# Patient Record
Sex: MALE | Race: WHITE | Employment: OTHER | URBAN - METROPOLITAN AREA
[De-identification: names, ages, dates, MRNs, and addresses within clinical notes are randomized per-mention and may not be internally consistent; named-entity substitution may affect disease eponyms.]

---

## 2017-10-06 ENCOUNTER — HOSPITAL ENCOUNTER (OUTPATIENT)
Dept: PET IMAGING | Age: 62
Discharge: HOME | End: 2017-10-06
Attending: INTERNAL MEDICINE
Payer: COMMERCIAL

## 2017-10-06 DIAGNOSIS — C43.9 MELANOMA (HCC): ICD-10-CM

## 2017-10-06 PROCEDURE — 78815 PET IMAGE W/CT SKULL-THIGH: CPT

## 2017-10-26 ENCOUNTER — HOSPITAL ENCOUNTER (OUTPATIENT)
Dept: ULTRASOUND IMAGING | Age: 62
Discharge: HOME | End: 2017-10-26
Attending: PHYSICIAN ASSISTANT
Payer: COMMERCIAL

## 2017-10-26 DIAGNOSIS — C43.4 MALIGNANT MELANOMA OF SKIN OF SCALP AND NECK (HCC): ICD-10-CM

## 2017-10-26 PROCEDURE — 76770 US EXAM ABDO BACK WALL COMP: CPT

## 2018-12-13 ENCOUNTER — HOSPITAL ENCOUNTER (OUTPATIENT)
Dept: ULTRASOUND IMAGING | Age: 63
Discharge: HOME | End: 2018-12-13
Attending: FAMILY MEDICINE
Payer: COMMERCIAL

## 2018-12-13 DIAGNOSIS — N28.1 RENAL CYST: ICD-10-CM

## 2018-12-13 PROCEDURE — 76770 US EXAM ABDO BACK WALL COMP: CPT

## 2020-08-12 ENCOUNTER — OFFICE VISIT (OUTPATIENT)
Dept: ORTHOPEDIC SURGERY | Age: 65
End: 2020-08-12

## 2020-08-12 VITALS
OXYGEN SATURATION: 99 % | BODY MASS INDEX: 26.21 KG/M2 | RESPIRATION RATE: 18 BRPM | TEMPERATURE: 97.5 F | HEART RATE: 87 BPM | WEIGHT: 167 LBS | SYSTOLIC BLOOD PRESSURE: 128 MMHG | HEIGHT: 67 IN | DIASTOLIC BLOOD PRESSURE: 88 MMHG

## 2020-08-12 DIAGNOSIS — M75.01 ADHESIVE CAPSULITIS OF RIGHT SHOULDER: Primary | ICD-10-CM

## 2020-08-12 DIAGNOSIS — M25.511 RIGHT SHOULDER PAIN, UNSPECIFIED CHRONICITY: ICD-10-CM

## 2020-08-12 RX ORDER — MELOXICAM 15 MG/1
15 TABLET ORAL
Qty: 30 TAB | Refills: 1 | Status: SHIPPED | OUTPATIENT
Start: 2020-08-12

## 2020-08-12 NOTE — PROGRESS NOTES
Ida Merlos  1955   Chief Complaint   Patient presents with    Shoulder Pain     Right shoulder pain        HISTORY OF PRESENT ILLNESS  Ida Merlos is a 72 y.o. male who presents today for evaluation of right shoulder. Pt referred by Dr. Marcy Yee. He rates his pain 0/10 today. Pain has been present since the end of April after throwing a baseball to his dog. Has tried taking an NSAID. Pain with certain activities, such was with swimming and playing tennis. No night pain. He does note some improvement since the initial onset of pain. Pain with certain movements, such as reaching behind. Has hx of a collarbone fracture, did not have surgery for this. He tried PT for the collarbone fracture. Patient describes the pain as sharp that is Intermittent in nature. Symptoms are worse with certain movements of the arm, Activity and is better with  Rest. Associated symptoms include nothing. Since problem started, it: has slightly improved. Pain does not wake patient up at night. Has taken NSAID for the problem. Has tried following treatments: Injections:NO; Brace:NO;  Therapy:NO; Cane/Crutch:NO       No Known Allergies     Past Medical History:   Diagnosis Date    Collar bone fracture     Right foot pain     Lateral plantar foot pain, possible compression neuropathy    Wears glasses       Social History     Socioeconomic History    Marital status:      Spouse name: Not on file    Number of children: Not on file    Years of education: Not on file    Highest education level: Not on file   Occupational History    Not on file   Social Needs    Financial resource strain: Not on file    Food insecurity     Worry: Not on file     Inability: Not on file   [employer] needs     Medical: Not on file     Non-medical: Not on file   Tobacco Use    Smoking status: Never Smoker    Smokeless tobacco: Never Used   Substance and Sexual Activity    Alcohol use: Yes     Comment: weekly  Drug use: No    Sexual activity: Not on file   Lifestyle    Physical activity     Days per week: Not on file     Minutes per session: Not on file    Stress: Not on file   Relationships    Social connections     Talks on phone: Not on file     Gets together: Not on file     Attends Voodoo service: Not on file     Active member of club or organization: Not on file     Attends meetings of clubs or organizations: Not on file     Relationship status: Not on file    Intimate partner violence     Fear of current or ex partner: Not on file     Emotionally abused: Not on file     Physically abused: Not on file     Forced sexual activity: Not on file   Other Topics Concern    Not on file   Social History Narrative    Not on file      Past Surgical History:   Procedure Laterality Date    HX APPENDECTOMY  1967    [address] Right     umbilical and groin    HX TONSILLECTOMY Bilateral 1974      Family History   Problem Relation Age of Onset    Arthritis-osteo Other     Hypertension Other       Current Outpatient Medications   Medication Sig    ergocalciferol (VITAMIN D2) 50,000 unit capsule Take 50,000 Units by mouth.  omega-3 fatty acids (FISH OIL) Cap Take  by mouth. No current facility-administered medications for this visit. REVIEW OF SYSTEM   Patient denies: Weight loss, Fever/Chills, HA, Visual changes, Fatigue, Chest pain, SOB, Abdominal pain, N/V/D/C, Blood in stool or urine, Edema. Pertinent positive as above in HPI. All others were negative    PHYSICAL EXAM:   Visit Vitals  /88 (BP 1 Location: Right arm, BP Patient Position: Sitting)   Pulse 87   Temp 97.5 °F (36.4 °C)   Resp 18   Ht 5' 7\" (1.702 m)   Wt 167 lb (75.8 kg)   SpO2 99%   BMI 26.16 kg/m²     The patient is a well-developed, well-nourished male   in no acute distress. The patient is alert and oriented times three. The patient is alert and oriented times three.  Mood and affect are normal.  LYMPHATIC: lymph nodes are not enlarged and are within normal limits  SKIN: normal in color and non tender to palpation. There are no bruises or abrasions noted. NEUROLOGICAL: Motor sensory exam is within normal limits. Reflexes are equal bilaterally. There is normal sensation to pinprick and light touch  MUSCULOSKELETAL:  Examination Right shoulder   Skin Intact   AC joint tenderness -   Biceps tenderness -   Forward flexion/Elevation    Active abduction    Glenohumeral abduction 80   External rotation ROM 45   Internal rotation ROM 30   Apprehension -   Yvettes Relocation -   Jerk -   Load and Shift -   Obriens -   Speeds -   Impingement sign +   Supraspinatus/Empty Can -, 5/5   External Rotation Strength -, 5/5   Lift Off/Belly Press -, 5/5   Neurovascular Intact       IMAGING: XR of right shoulder obtained in the office dated 8/12/2020 was reviewed and read by Dr. Good Hicks: Sclerotic changes in the greater tuberosity. IMPRESSION:      ICD-10-CM ICD-9-CM    1. Adhesive capsulitis of right shoulder  M75.01 726.0 REFERRAL TO PHYSICAL THERAPY      meloxicam (Mobic) 15 mg tablet   2. Right shoulder pain, unspecified chronicity  M25.511 719.41 AMB POC XRAY, SHOULDER; COMPLETE, 2+        PLAN:  1. Pt presents today with right shoulder pain due to adhesive capsulitis and I would like for him to begin PT. Was also given a prescription for Mobic today. Risk factors include: n/a   2. No ultrasound exam indicated today  3. No cortisone injection indicated today   4. Yes Physical/Occupational Therapy indicated today  5. No diagnostic test indicated today:   6. No durable medical equipment indicated today  7. No referral indicated today   8. Yes medications indicated today: MOBIC  9. No Narcotic indicated today       RTC 4 weeks    Office note will be sent to referring provider.     Scribed by Thad Peterson) as dictated by Luciano Stewart MD    I, Dr. Luciano Stewart, confirm that all documentation is accurate.     Scott Hernandez M.D.   [facility]

## 2020-08-19 ENCOUNTER — HOSPITAL ENCOUNTER (OUTPATIENT)
Dept: PHYSICAL THERAPY | Age: 65
Discharge: HOME | End: 2020-08-19
Payer: MEDICARE

## 2020-08-19 PROCEDURE — 97110 THERAPEUTIC EXERCISES: CPT

## 2020-08-19 PROCEDURE — 97112 NEUROMUSCULAR REEDUCATION: CPT

## 2020-08-19 PROCEDURE — 97161 PT EVAL LOW COMPLEX 20 MIN: CPT

## 2020-08-19 NOTE — PROGRESS NOTES
[facility]  [address]  [place], [address]  [phone] fax    Plan of Care/ Statement of Necessity for Physical Therapy Services    Patient name: Shelley Reid Start of Care: 2020   Referral source: Rajendra Lundy : 1955    Medical Diagnosis: Right shoulder pain [M25.511]  Payor: [place] MEDICARE / Plan: [place] MEDICARE PART A & B / Product Type: Medicare /  Onset VKTJ:3-    Treatment Diagnosis: Right Shoulder Pain   Prior Hospitalization: see medical history Provider#: [id number]   Medications: Verified on Patient summary List    Comorbidities: Skin Cancer    Prior Level of Function: Independent      The Plan of Care and following information is based on the information from the initial evaluation. Assessment/ key information:   Patient is a 73 yo male with 4month history of Right shoulder pain/dysfunction. His orginial injury occurred when he threw a ball for the dog in an underhand motion. Currently he reports difficulty reaching behind back, quick motions, motions that involve reaching across body and weighted motions overhead. He also reports he is unable to use arm to push up from floor. Grossly his strength in his Right UE is functional grossly 4-5/5. However he has poor scap/humeral rhythm especially in ABD/Scap and greater pain with eccentric motion that is elivated with support. He demonstrates some shoulder instability with positive lift off, posterior impingement, apprehension/relocation, cross arm AC joint testing. Evaluation Complexity History MEDIUM  Complexity : 1-2 comorbidities / personal factors will impact the outcome/ POC ; Examination MEDIUM Complexity : 3 Standardized tests and measures addressing body structure, function, activity limitation and / or participation in recreation  ;Presentation MEDIUM Complexity : Evolving with changing characteristics  ; Clinical Decision Making MEDIUM Complexity : FOTO score of 26-74  Overall Complexity Rating: MEDIUM  Problem List: pain affecting function, decrease ROM, decrease strength, decrease ADL/ functional abilitiies and decrease flexibility/ joint mobility   Treatment Plan may include any combination of the following: Therapeutic exercise, Therapeutic activities, Neuromuscular re-education, Manual therapy and Patient education  Patient / Family readiness to learn indicated by: asking questions, trying to perform skills and interest  Persons(s) to be included in education: patient (P)  Barriers to Learning/Limitations: None  Patient Goal (s): Regain ROM and strength in Right shoulder  Patient Self Reported Health Status: good  Rehabilitation Potential: good    Short Term Goals: To be accomplished in 2 weeks:   1. Patient will be educated and independent with HEP   2. Patient to be educated and independent in shoulder setting with ROM and strength exercise. Long Term Goals: To be accomplished in 4 weeks:   1. Patient to report ability to reach across body when showering for ADL's   2. Patient to report ability to reach back while driving with minimal pain. 3.  Patient to report ability to overhead reach without eccentric pain. 4. Patient to report increased FOTO score to 74 or greater for return to function. Frequency / Duration: Patient to be seen 2-3  times per week for 4 weeks. Patient/ Caregiver education and instruction: Diagnosis, prognosis, self care, activity modification and exercises   [x]  Plan of care has been reviewed with PTA    Certification Period: 08/19/2020 -09/17/2020  Key Rodriguez, PT 8/19/2020 3:59 PM    ________________________________________________________________________    I certify that the above Therapy Services are being furnished while the patient is under my care. I agree with the treatment plan and certify that this therapy is necessary.     Physician's Signature:____________________ Date:____________Time: _________    Please sign and return to [facility] [address] [address] [address]  [place], [facility] [address]  [phone] fax

## 2020-08-19 NOTE — PROGRESS NOTES
PT DAILY TREATMENT NOTE/SHOULDER EVAL 10-18    Patient Name: Joseph Montgomery  Date:2020  : 1955  [x]  Patient  Verified  Payor: VA MEDICARE / Plan: VA MEDICARE PART A & B / Product Type: Medicare /    In time:1030  Out time:1115  Total Treatment Time (min): 45  Visit #: 1  of     Medicare/BCBS Only   Total Timed Codes (min):  25 1:1 Treatment Time:  45       Treatment Area: Right shoulder pain [M25.511]    SUBJECTIVE  Pain Level (0-10 scale): 0/10 at rest, 5-1/ with certain activities. []constant [x]intermittent [x]improving []worsening []no change since onset  Improved pain at rest, but avoiding most activities that cause pain. Any medication changes, allergies to medications, adverse drug reactions, diagnosis change, or new procedure performed?: [x] No    [] Yes (see summary sheet for update)  Subjective functional status/changes:     PLOF: Independent pain free shoulder use/motion  Limitations to PLOF: Pain  Mechanism of Injury: Throwing ball  Current symptoms/Complaints: Most pain in posterior lateral shoulder throbbing pain, and can be sharp at times. Pain now also in anterior shoulder. Previous Treatment/Compliance: None for this. PMHx/Surgical Hx: No recent  Work Hx: Retired  Living Situation: Wife and two dogs. Pt Goals: \" Regain ROM and strength in Right shoulder\"  Barriers: []pain []financial []time []transportation []other None  Motivation: Good      OBJECTIVE/EXAMINATION  Activity/Recreational Limitations: Unable to throw ball, Unable to swim front crawl stroke  Mobility: Can't reach into back seat while driving with right arm, Can't push up from floor with right arm, can't perform quick motions, unable to lift weight above head. Self Care: Can't reach across body to wash under Left armpit with right hand.       20 min [x]Eval                  []Re-Eval       15 min Therapeutic Exercise:  [] See flow sheet :   Rationale: increase ROM, increase strength and increase proprioception to improve the patients ability to perform ADL's with ease. 10 min Neuromuscular Re-education:  [x]  See flow sheet : Shoulder positioning and low trap setting with rest and movement. Postural education to decrease impingement. Rationale: increase ROM, increase strength and increase proprioception  to improve the patients ability to perform ADL's with ease. With   [] TE   [] TA   [] neuro   [] other: Patient Education: [x] Review HEP    [] Progressed/Changed HEP based on:   [] positioning   [] body mechanics   [] transfers   [] heat/ice application    [] other:        Physical Therapy Evaluation - Shoulder    Posture: [] Poor    [x] Fair    [] Good    Describe: Anterior Head positioning, inferior Right Shoulder depressed scapula, sway back. ROM:  [] Unable to assess at this time                                           AROM  Standing                         PROM: Supine   Left Right  Left Right   Flexion  120 Flexion  115   Extension  NT Extension  NT   Scaption/ABD  120 Scaptin/ABD  90   ER @ 0 Degrees  40 ER @ 0 Degrees  20   ER @ 90 Degrees  NT ER @ 90 Degrees  35   IR  sacrum IR @ 90 Degrees  20     End Feel / Painful Arc:  AROM includes upper trap and scapula elevation. Increased pain with eccentric ROM. PROM is limited at empty pain limited endfeel     Strength:   [] Unable to assess at this time                                                                            L (1-5) R (1-5) Pain   Flexors  4+ [] Yes   [] No   Abductors  4 [] Yes   [] No   External Rotators  4 [] Yes   [] No   Internal Rotators  5 [] Yes   [] No   Supraspinatus  4 [] Yes   [] No   Serratus Anterior  4 [] Yes   [] No   Lower Trapezius  PAIN [] Yes   [] No   Elbow Flexion  5 [] Yes   [] No   Elbow Extension  5 [] Yes   [] No       Scapulohumoral Control / Rhythm:  Able to eccentrically lower with good control?  Left: [] Yes   [] No     Right: [] Yes   [x] No    Accessory Motions:Upper Trap recruitment, scapula ABD and elevation  Palpation  [] Min  [x] Mod  [] Severe    Location: AC Joint, Supra, posterior capsule       Optional Tests:    Adson's Test  [] Pos   [] Neg Yergason's Test [] Pos   [] Neg  Sandra's Test  [] Pos   [] Neg Amish's Sign [] Pos   [] Neg  Neer's Test  [] Pos   [x] Neg Clunk Test  [] Pos   [] Neg  Hawkin's Test  [x] Pos   [] Neg AC Joint  [x] Pos   [] Neg  Speed's Test  [] Pos   [x] Neg SC Joint  [] Pos   [] Neg  Empty Can  [] Pos   [x] Neg Pectoral Tightness [] Pos   [] Neg  Anterior Apprehension [x] Pos   [] Neg   Posterior Apprehension [] Pos   [] Neg      Other Tests / Comments: Drop Arm, Positive, Lift off Positive, Cross Arm Positive     Pain Level (0-10 scale) post treatment: 1/10 at rest    ASSESSMENT/Changes in Function: Patient is a 73 yo male with 4month history of Right shoulder pain/dysfunction. His orginial injury occurred when he threw a ball for the dog in an underhand motion. Currently he reports difficulty reaching behind back, quick motions, motions that involve reaching across body and weighted motions overhead. He also reports he is unable to use arm to push up from floor. Grossly his strength in his Right UE is functional grossly 4-5/5. However he has poor scap/humeral rhythm especially in ABD/Scap and greater pain with eccentric motion that is elivated with support. He demonstrates some shoulder instability with positive lift off, posterior impingement, apprehension/relocation, cross arm AC joint testing. Patient will continue to benefit from skilled PT services to modify and progress therapeutic interventions, address functional mobility deficits, address ROM deficits, address strength deficits, analyze and address soft tissue restrictions, analyze and cue movement patterns and assess and modify postural abnormalities to attain remaining goals.      [x]  See Plan of Care  []  See progress note/recertification  []  See Discharge Summary Progress towards goals / Updated goals:  Short Term Goals: To be accomplished in 2 weeks:   1. Patient will be educated and independent with HEP   EVAL: Issued Initial Exercise    2. Patient to be educated and independent in shoulder setting with ROM and strength exercise. EVAL:  Educated pt in shoulder setting. Long Term Goals: To be accomplished in 4 weeks:   1. Patient to report ability to reach across body when showering for ADL's   EVAL:  Patient unable to reach across body to opposite axilla. 2.  Patient to report ability to reach back while driving with minimal pain. EVAL:  Patient unable to reach behind without severe pain. 3.  Patient to report ability to overhead reach without eccentric pain. EVAL:  Patient needing upper arm support with eccentric motion. 4. Patient to report increased FOTO score to 74 or greater for return to function.      EVAL:  FOTO 61    PLAN  []  Upgrade activities as tolerated     [x]  Continue plan of care  []  Update interventions per flow sheet       []  Discharge due to:_  []  Other:_      Urban Salinas, PT 8/19/2020  10:38 AM

## 2020-08-24 ENCOUNTER — HOSPITAL ENCOUNTER (OUTPATIENT)
Dept: PHYSICAL THERAPY | Age: 65
Discharge: HOME | End: 2020-08-24
Payer: MEDICARE

## 2020-08-24 PROCEDURE — 97140 MANUAL THERAPY 1/> REGIONS: CPT

## 2020-08-24 PROCEDURE — 97110 THERAPEUTIC EXERCISES: CPT

## 2020-08-24 NOTE — PROGRESS NOTES
PT DAILY TREATMENT NOTE 10-18    Patient Name: Maci Search  Date:2020  : 1955  [x]  Patient  Verified  Payor: VA MEDICARE / Plan: VA MEDICARE PART A & B / Product Type: Medicare /    In time:10:30  Out time:11:15  Total Treatment Time (min): 45  Visit #: 2 of     Medicare/BCBS Only   Total Timed Codes (min):  35 1:1 Treatment Time:  35       Treatment Area: Right shoulder pain [M25.511]    SUBJECTIVE  Pain Level (0-10 scale): 0/10  Any medication changes, allergies to medications, adverse drug reactions, diagnosis change, or new procedure performed?: [x] No    [] Yes (see summary sheet for update)  Subjective functional status/changes:   [] No changes reported  Pt reports no new complaints of pain.      OBJECTIVE    Modality rationale: decrease inflammation and decrease pain to improve the patients ability to tolerate ADLs   Min Type Additional Details    [] Estim:  []Unatt       []IFC  []Premod                        []Other:  []w/ice   []w/heat  Position:  Location:    [] Estim: []Att    []TENS instruct  []NMES                    []Other:  []w/US   []w/ice   []w/heat  Position:  Location:    []  Traction: [] Cervical       []Lumbar                       [] Prone          []Supine                       []Intermittent   []Continuous Lbs:  [] before manual  [] after manual    []  Ultrasound: []Continuous   [] Pulsed                           []1MHz   []3MHz W/cm2:  Location:    []  Iontophoresis with dexamethasone         Location: [] Take home patch   [] In clinic   10 [x]  Ice     []  heat  []  Ice massage  []  Laser   []  Anodyne Position:sitting  Location:right shoulder    []  Laser with stim  []  Other:  Position:  Location:    []  Vasopneumatic Device Pressure:       [] lo [] med [] hi   Temperature: [] lo [] med [] hi   [] Skin assessment post-treatment:  []intact []redness- no adverse reaction    []redness  adverse reaction:     27 min Therapeutic Exercise:  [x] See flow sheet :   Rationale: increase ROM and increase strength to improve the patients ability to tolerate ADLs. 8 min Manual Therapy:  PROM to right shoulder; Inferior/posterior GHJ mobs grade 2-3, ST mobility with patient in sidelying   Rationale: decrease pain, increase ROM and increase tissue extensibility to improve functional mobility of right shoulder. With   [] TE   [] TA   [] neuro   [] other: Patient Education: [x] Review HEP    [] Progressed/Changed HEP based on:   [] positioning   [] body mechanics   [] transfers   [] heat/ice application    [] other:      Other Objective/Functional Measures: initiated exercises as per flow sheet. Pain Level (0-10 scale) post treatment: 0/10    ASSESSMENT/Changes in Function: Pt demonstrates improved available PROM with gentle distraction to GHJ. Pt continues to demonstrate poor scapulohumeral with continued impingement of GHJ. Patient will continue to benefit from skilled PT services to modify and progress therapeutic interventions, address functional mobility deficits, address ROM deficits, address strength deficits, analyze and address soft tissue restrictions, analyze and cue movement patterns and analyze and modify body mechanics/ergonomics to attain remaining goals. []  See Plan of Care  []  See progress note/recertification  []  See Discharge Summary         Progress towards goals / Updated goals:  Short Term Goals: To be accomplished in 2 weeks:              1.  Patient will be educated and independent with HEP              EVAL: Issued Initial Exercise               2.  Patient to be educated and independent in shoulder setting with ROM and strength exercise. EVAL:  Educated pt in shoulder setting.     Long Term Goals: To be accomplished in 4 weeks:              1.  Patient to report ability to reach across body when showering for ADL's              EVAL:  Patient unable to reach across body to opposite axilla.               2. Patient to report ability to reach back while driving with minimal pain. EVAL:  Patient unable to reach behind without severe pain. 3.  Patient to report ability to overhead reach without eccentric pain. EVAL:  Patient needing upper arm support with eccentric motion. 4. Patient to report increased FOTO score to 74 or greater for return to function.                 EVAL:  FOTO 61    PLAN  []  Upgrade activities as tolerated     [x]  Continue plan of care  []  Update interventions per flow sheet       []  Discharge due to:_  []  Other:_      Dinesh Gil PTA 8/24/2020  10:33 AM    Future Appointments   Date Time Provider Bridget Wolfe   8/28/2020  8:30 AM Lorraine Herrera, SIMONA [facility]   8/31/2020  7:00 AM [facility]   9/2/2020  9:00 AM [facility]   9/8/2020  7:00 AM [facility]   9/14/2020  7:00 AM [facility]als[facility]   9/14/2020  1:00 PM [facility]

## 2020-08-28 ENCOUNTER — HOSPITAL ENCOUNTER (OUTPATIENT)
Dept: PHYSICAL THERAPY | Age: 65
Discharge: HOME | End: 2020-08-28
Payer: MEDICARE

## 2020-08-28 PROCEDURE — 97140 MANUAL THERAPY 1/> REGIONS: CPT

## 2020-08-28 PROCEDURE — 97110 THERAPEUTIC EXERCISES: CPT

## 2020-08-28 NOTE — PROGRESS NOTES
PT DAILY TREATMENT NOTE 10-18    Patient Name: Agnieszka Records  Date:2020  : 1955  [x]  Patient  Verified  Payor: [place] MED[place] / Plan: [place] MED[place]ARE PART A & B / Product Type: Medicare /    In time: 8:32  Out time:9:23  Total Treatment Time (min): 51  Visit #: 3 of     Medicare/BCBS Only   Total Timed Codes (min):  51 1:1 Treatment Time:  42       Treatment Area: Right shoulder pain [M25.511]    SUBJECTIVE  Pain Level (0-10 scale): 0/10  Any medication changes, allergies to medications, adverse drug reactions, diagnosis change, or new procedure performed?: [x] No    [] Yes (see summary sheet for update)  Subjective functional status/changes:   [] No changes reported  Patient stated that his shoulder is getting better and he has noticed his range of motion has improved. Patient stated he has been doing more swimming in his pool, but avoids freestyle stroke or bringing his arm overhead.      OBJECTIVE    Modality rationale: PD   Min Type Additional Details    [] Estim:  []Unatt       []IFC  []Premod                        []Other:  []w/ice   []w/heat  Position:  Location:    [] Estim: []Att    []TENS instruct  []NMES                    []Other:  []w/US   []w/ice   []w/heat  Position:  Location:    []  Traction: [] Cervical       []Lumbar                       [] Prone          []Supine                       []Intermittent   []Continuous Lbs:  [] before manual  [] after manual    []  Ultrasound: []Continuous   [] Pulsed                           []1MHz   []3MHz W/cm2:  Location:    []  Iontophoresis with dexamethasone         Location: [] Take home patch   [] In clinic    []  Ice     []  heat  []  Ice massage  []  Laser   []  Anodyne Position:  Location:    []  Laser with stim  []  Other:  Position:  Location:    []  Vasopneumatic Device Pressure:       [] lo [] med [] hi   Temperature: [] lo [] med [] hi   [] Skin assessment post-treatment:  []intact []redness- no adverse reaction    []redness  adverse reaction:     41 min Therapeutic Exercise:  [x] See flow sheet :   Rationale: increase ROM and increase strength to improve the patients ability to perform ADLs safely and efficiently. 10 min Manual Therapy:  STM along right vertebral border of scap; Scapulothoracic mobility with patient in sidelying; therapist guided resisted scapular motion into protraction/elevation and retraction/depression    Rationale: decrease pain, increase ROM and increase tissue extensibility to improve GHJ and ST mechanics and increase ease with overhead activities. With   [] TE   [] TA   [] neuro   [] other: Patient Education: [x] Review HEP    [] Progressed/Changed HEP based on:   [] positioning   [] body mechanics   [] transfers   [] heat/ice application    [] other:      Other Objective/Functional Measures: Patient reported mild discomfort with t-band ER so reduced reps to 10. Added ER T-band Walk outs and patient was able to perform with less discomfort. Pain Level (0-10 scale) post treatment: 0/10     ASSESSMENT/Changes in Function: therapist cued patient for proper technique and muscle activation with exercises. Therapist cued patient for posture prior to exercises and discussed importance of scapular/shoulder positioning in regards to glenohumeral mechanics. Tightness/trigger points noted along vertebral border of right scap. Following manual and exercises patient reported not feeling as tight and had no pain. Patient will continue to benefit from skilled PT services to modify and progress therapeutic interventions, address functional mobility deficits, address ROM deficits, address strength deficits, analyze and address soft tissue restrictions, analyze and cue movement patterns, analyze and modify body mechanics/ergonomics and assess and modify postural abnormalities to attain remaining goals.      []  See Plan of Care  []  See progress note/recertification  []  See Discharge Summary Progress towards goals / Updated goals:  Short Term Goals: To be accomplished in 2 weeks:              3. Natasha Toscano will be educated and independent with HEP              EVAL: Issued Initial Exercise               2.  Patient to be educated and independent in shoulder setting with ROM and strength exercise.               EVAL:  Educated pt in shoulder setting.     Long Term Goals: To be accomplished in 4 weeks:              1.  Patient to report ability to reach across body when showering for ADL's              EVAL:  Patient unable to reach across body to opposite axilla. Current: Patient is able to reach left axilla (8/28/20)               2.  Patient to report ability to reach back while driving with minimal pain.              EVAL:  Patient unable to reach behind without severe pain. Current:               3.  Patient to report ability to overhead reach without eccentric pain.              EVAL:  Patient needing upper arm support with eccentric motion.    Current: patient was able to reach overhead and lower arm to side independently and without pain (8/28/20)               4. Patient to report increased FOTO score to 74 or greater for return to function.                EVAL:  FOTO 61    PLAN  []  Upgrade activities as tolerated     [x]  Continue plan of care  []  Update interventions per flow sheet       []  Discharge due to:_  []  Other:_      Inocente Day, PT 8/28/2020  8:40 AM    Future Appointments   Date Time Provider Bridget Wolfe   8/31/2020  7:00 AM [facility]   9/2/2020  9:00 AM [facility]   9/8/2020  7:00 AM [facility]   9/14/2020  7:00 AM [facility]   9/14/2020  1:00 PM Rajendra Lundy MD [place] [id number]

## 2020-08-31 ENCOUNTER — HOSPITAL ENCOUNTER (OUTPATIENT)
Dept: PHYSICAL THERAPY | Age: 65
Discharge: HOME | End: 2020-08-31
Payer: MEDICARE

## 2020-08-31 PROCEDURE — 97110 THERAPEUTIC EXERCISES: CPT

## 2020-08-31 PROCEDURE — 97112 NEUROMUSCULAR REEDUCATION: CPT

## 2020-08-31 PROCEDURE — 97140 MANUAL THERAPY 1/> REGIONS: CPT

## 2020-08-31 NOTE — PROGRESS NOTES
PT DAILY TREATMENT NOTE 10-18    Patient Name: Erin Escobar  Date:2020  : 1955  [x]  Patient  Verified  Payor: [place] MEDICARE / Plan: [place] MEDICARE PART A & B / Product Type: Medicare /    In time:703  Out time:745  Total Treatment Time (min): 42  Visit #: 4 of     Medicare/BCBS Only   Total Timed Codes (min):  42 1:1 Treatment Time:  42       Treatment Area: Right shoulder pain [M25.511]    SUBJECTIVE  Pain Level (0-10 scale): 0  Any medication changes, allergies to medications, adverse drug reactions, diagnosis change, or new procedure performed?: [x] No    [] Yes (see summary sheet for update)  Subjective functional status/changes:   [] No changes reported  Reports stretching is still painful but otherwise minimal c/o. OBJECTIVE    26 min Therapeutic Exercise:  [x] See flow sheet : exercises initiated per flowsheet   Rationale: increase ROM and increase strength to improve the patients ability to tolerate overhead ADLs. 8 min Neuromuscular Re-education:  [x]  See flow sheet : prone on elbows and quadruped serratus anterior work to improve scapular-thoracic rhythm and control   Rationale: increase strength, improve coordination and increase proprioception  to improve the patients ability to tolerate overhead ADLs through arc pain range. 8 min Manual Therapy:  STM superior border right scapula in S/L and supscapularis DTM. Upward and downward rotation STJ mobilizations GR2-3 followed by active assisted scapular depression with GH flexion/extension followed by resisted [address] flexion/extension. Rationale: decrease pain, increase tissue extensibility and increase postural awareness to improve ease of self care.             With   [] TE   [] TA   [] neuro   [] other: Patient Education: [x] Review HEP    [] Progressed/Changed HEP based on:   [] positioning   [] body mechanics   [] transfers   [] heat/ice application    [] other:      Other Objective/Functional Measures: improved ease and decreased pain of GH flexion with assisted scapular depression. Pain Level (0-10 scale) post treatment: 0    ASSESSMENT/Changes in Function: Pt continues to have arc pain and scapulothoracic rhythm deficits with overhead flexion and scaption. Pt cued to depress scapula and perform backward shoulder rolls to relax upper trapezius. Progressed pt to prone on elbows serratus anterior exercises and scapular \"T\" exercise in prone to assist with strengthening force couples. Educated pt to perform IR behind the back towel stretching. Pt asked if he could perform all of these exercises at home and was educated that prone on elbows serratus anterior push-ups and IR stretching can be performed at home, but to refrain from alternate flexion in prone on elbows until his posture improves. Continue to progress per toleration. Patient will continue to benefit from skilled PT services to modify and progress therapeutic interventions, address functional mobility deficits, address ROM deficits, address strength deficits, analyze and address soft tissue restrictions, analyze and cue movement patterns, analyze and modify body mechanics/ergonomics, assess and modify postural abnormalities and instruct in home and community integration to attain remaining goals.      [x]  See Plan of Care  []  See progress note/recertification  []  See Discharge Summary         Progress towards goals / Updated goals:  Short Term Goals: To be accomplished in 2 weeks:  1.  Patient will be educated and independent with HEP              EVAL: Issued Initial Exercise    Current: met, pt reports compliance (8/31/2020)  2.  Patient to be educated and independent in shoulder setting with ROM and strength exercise.               EVAL:  Educated pt in shoulder setting.     Long Term Goals: To be accomplished in 4 weeks:  1.  Patient to report ability to reach across body when showering for ADL's              EVAL:  Patient unable to reach across body to opposite axilla. Current: progressing, Patient is able to reach left axilla (8/28/20)   2.  Patient to report ability to reach back while driving with minimal pain.              EVAL:  Patient unable to reach behind without severe pain. Current:   3.  Patient to report ability to overhead reach without eccentric pain.              EVAL:  Patient needing upper arm support with eccentric motion. Current: patient was able to reach overhead and lower arm to side independently and without pain (8/28/20)   4.  Patient to report increased FOTO score to 74 or greater for return to function.                EVAL:  FOTO 61    PLAN  [x]  Upgrade activities as tolerated     [x]  Continue plan of care  []  Update interventions per flow sheet       []  Discharge due to:_  []  Other:_      Lucien Acuna, PT 8/31/2020  7:18 AM    Future Appointments   Date Time Provider Bridget Nessi   9/2/2020  9:00 AM Lindsey Quach PTA [facility]   9/8/2020  7:00 AM Lindsey Quach PTA [facility]   9/14/2020  7:00 AM Priscila Wan, PT [facility]   9/14/2020  1:00 PM [facility] Octave, [facility] [address] [facility]

## 2020-09-02 ENCOUNTER — HOSPITAL ENCOUNTER (OUTPATIENT)
Dept: PHYSICAL THERAPY | Age: 65
Discharge: HOME | End: 2020-09-02
Payer: MEDICARE

## 2020-09-02 PROCEDURE — 97110 THERAPEUTIC EXERCISES: CPT

## 2020-09-02 PROCEDURE — 97140 MANUAL THERAPY 1/> REGIONS: CPT

## 2020-09-02 NOTE — PROGRESS NOTES
PT DAILY TREATMENT NOTE 10-18    Patient Name: Wei Steinberg  Date:2020  : 1955  [x]  Patient  Verified  Payor: [place] MEDICARE / Plan: [place] MEDICARE PART A & B / Product Type: Medicare /    In time:9:00  Out time:9:45  Total Treatment Time (min): 45  Visit #: 5 of -    Medicare/BCBS Only   Total Timed Codes (min):  35 1:1 Treatment Time:  35       Treatment Area: Right shoulder pain [M25.511]    SUBJECTIVE  Pain Level (0-10 scale): 0/10  Any medication changes, allergies to medications, adverse drug reactions, diagnosis change, or new procedure performed?: [x] No    [] Yes (see summary sheet for update)  Subjective functional status/changes:   [] No changes reported  Pt reports no new complaints of pain. Pt reports compliance with HEP    OBJECTIVE    Modality rationale: decrease inflammation and decrease pain to improve the patients ability to tolerate ADLs.    Min Type Additional Details    [] Estim:  []Unatt       []IFC  []Premod                        []Other:  []w/ice   []w/heat  Position:  Location:    [] Estim: []Att    []TENS instruct  []NMES                    []Other:  []w/US   []w/ice   []w/heat  Position:  Location:    []  Traction: [] Cervical       []Lumbar                       [] Prone          []Supine                       []Intermittent   []Continuous Lbs:  [] before manual  [] after manual    []  Ultrasound: []Continuous   [] Pulsed                           []1MHz   []3MHz W/cm2:  Location:    []  Iontophoresis with dexamethasone         Location: [] Take home patch   [] In clinic   10 [x]  Ice     []  heat  []  Ice massage  []  Laser   []  Anodyne Position: sitting  Location:right shoulder    []  Laser with stim  []  Other:  Position:  Location:    []  Vasopneumatic Device Pressure:       [] lo [] med [] hi   Temperature: [] lo [] med [] hi   [] Skin assessment post-treatment:  []intact []redness- no adverse reaction    []redness  adverse reaction:     17 min Therapeutic Exercise:  [x] See flow sheet :   Rationale: increase ROM and increase strength to improve the patients ability to tolerate ADLs. 10 min Neuromuscular Re-education:  [x]  See flow sheet :   Rationale: increase strength, improve coordination and increase proprioception  to improve the patients ability to perform functional activities with increased ease. 8 min Manual Therapy:  PROM to right GHJ with distraction; DTm to right mid scapular boarder, infraspinatus, supraspinatus, rhomboids, UT. ST mobility with patient in left sidelying. Rationale: decrease pain, increase ROM and increase tissue extensibility to improve functional mobility of right UE. With   [] TE   [] TA   [] neuro   [] other: Patient Education: [x] Review HEP    [] Progressed/Changed HEP based on:   [] positioning   [] body mechanics   [] transfers   [] heat/ice application    [] other:      Other Objective/Functional Measures: Pt has continued pain with OH elevation of right shoulder. Pain Level (0-10 scale) post treatment: 0/10    ASSESSMENT/Changes in Function: Pt demonstrates continued impingement and right shoulder instability with shoulder elevation greater than 90 degrees. Patient will continue to benefit from skilled PT services to modify and progress therapeutic interventions, address functional mobility deficits, address ROM deficits, address strength deficits, analyze and address soft tissue restrictions, analyze and cue movement patterns and analyze and modify body mechanics/ergonomics to attain remaining goals.      []  See Plan of Care  []  See progress note/recertification  []  See Discharge Summary         Progress towards goals / Updated goals:  Short Term Goals: To be accomplished in 2 weeks:  1.  Patient will be educated and independent with HEP              EVAL: Issued Initial Exercise               Current: met, pt reports compliance (8/31/2020)  2.  Patient to be educated and independent in shoulder setting with ROM and strength exercise.               EVAL:  Educated pt in shoulder setting.     Long Term Goals: To be accomplished in 4 weeks:  1.  Patient to report ability to reach across body when showering for ADL's              EVAL:  Patient unable to reach across body to opposite axilla.              Current: progressing, Patient is able to reach left axilla (8/28/20)   2.  Patient to report ability to reach back while driving with minimal pain.              EVAL:  Patient unable to reach behind without severe pain.              Current:   3.  Patient to report ability to overhead reach without eccentric pain.              EVAL:  Patient needing upper arm support with eccentric motion.              Current: patient was able to reach overhead and lower arm to side independently and without pain (8/28/20)   4. Patient to report increased FOTO score to 74 or greater for return to function.                EVAL:  FOTO 61    PLAN  []  Upgrade activities as tolerated     [x]  Continue plan of care  []  Update interventions per flow sheet       []  Discharge due to:_  []  Other:_      Josef Browning PTA 9/2/2020  9:11 AM    Future Appointments   Date Time Provider Bridget Wolfe   9/8/2020  7:00 AM [facility]   9/14/2020  7:00 AM [facility]   9/14/2020  1:00 PM MD Jina Covarrubias

## 2020-09-08 ENCOUNTER — HOSPITAL ENCOUNTER (OUTPATIENT)
Dept: PHYSICAL THERAPY | Age: 65
Discharge: HOME | End: 2020-09-08
Payer: MEDICARE

## 2020-09-08 PROCEDURE — 97140 MANUAL THERAPY 1/> REGIONS: CPT

## 2020-09-08 PROCEDURE — 97110 THERAPEUTIC EXERCISES: CPT

## 2020-09-08 NOTE — PROGRESS NOTES
PT DAILY TREATMENT NOTE 10-18    Patient Name: Maci Search  Date:2020  : 1955  [x]  Patient  Verified  Payor: [place] MEDICARE / Plan: [place] MEDICARE PART A & B / Product Type: Medicare /    In time:7:03  Out time:7:50  Total Treatment Time (min): 47  Visit #: 6 of     Medicare/BCBS Only   Total Timed Codes (min):  37 1:1 Treatment Time:  37       Treatment Area: Right shoulder pain [M25.511]    SUBJECTIVE  Pain Level (0-10 scale): 0/10  Any medication changes, allergies to medications, adverse drug reactions, diagnosis change, or new procedure performed?: [x] No    [] Yes (see summary sheet for update)  Subjective functional status/changes:   [] No changes reported  Pt denies pain currently. Pt reports compliance with HEP. Pt reports decreased difficulty reaching into backseat from drivers seat of the car. OBJECTIVE    Modality rationale: decrease inflammation and decrease pain to improve the patients ability to tolerate ADLs.     Min Type Additional Details    [] Estim:  []Unatt       []IFC  []Premod                        []Other:  []w/ice   []w/heat  Position:  Location:    [] Estim: []Att    []TENS instruct  []NMES                    []Other:  []w/US   []w/ice   []w/heat  Position:  Location:    []  Traction: [] Cervical       []Lumbar                       [] Prone          []Supine                       []Intermittent   []Continuous Lbs:  [] before manual  [] after manual    []  Ultrasound: []Continuous   [] Pulsed                           []1MHz   []3MHz W/cm2:  Location:    []  Iontophoresis with dexamethasone         Location: [] Take home patch   [] In clinic   10 [x]  Ice     []  heat  []  Ice massage  []  Laser   []  Anodyne Position:sitting  Location:right shoulder    []  Laser with stim  []  Other:  Position:  Location:    []  Vasopneumatic Device Pressure:       [] lo [] med [] hi   Temperature: [] lo [] med [] hi   [] Skin assessment post-treatment:  []intact []redness- no adverse reaction    []redness  adverse reaction:     29 min Therapeutic Exercise:  [x] See flow sheet :   Rationale: increase ROM and increase strength to improve the patients ability to tolerate ADLs. 8 min Manual Therapy:  ST mobility to right side. PROM to right GHJ with distraction. Rationale: decrease pain, increase ROM and increase tissue extensibility to improve functional mobility of right UE. With   [] TE   [] TA   [] neuro   [] other: Patient Education: [x] Review HEP    [] Progressed/Changed HEP based on:   [] positioning   [] body mechanics   [] transfers   [] heat/ice application    [] other:      Other Objective/Functional Measures: Continued trigger points through right shoulder girdle. Pain Level (0-10 scale) post treatment: 0/10    ASSESSMENT/Changes in Function: Pt has fair carry over with vc's to correct shoulder position. Patient will continue to benefit from skilled PT services to modify and progress therapeutic interventions, address functional mobility deficits, address ROM deficits, address strength deficits, analyze and address soft tissue restrictions, analyze and cue movement patterns and analyze and modify body mechanics/ergonomics to attain remaining goals.      []  See Plan of Care  []  See progress note/recertification  []  See Discharge Summary         Progress towards goals / Updated goals:  Short Term Goals: To be accomplished in 2 weeks:  1.  Patient will be educated and independent with HEP              EVAL: Issued Initial Exercise               Current: met, pt reports compliance (8/31/2020)  2.  Patient to be educated and independent in shoulder setting with ROM and strength exercise.               EVAL:  Educated pt in shoulder setting.     Long Term Goals: To be accomplished in 4 weeks:  1.  Patient to report ability to reach across body when showering for ADL's              EVAL:  Patient unable to reach across body to opposite axilla.              Current: progressing, Patient is able to reach left axilla (8/28/20)   2.  Patient to report ability to reach back while driving with minimal pain.              EVAL:  Patient unable to reach behind without severe pain.              Current: Progressing with decreased c/o pain.  9/8/2020  3.  Patient to report ability to overhead reach without eccentric pain.              EVAL:  Patient needing upper arm support with eccentric motion.              Current: patient was able to reach overhead and lower arm to side independently and without pain (8/28/20)   4. Patient to report increased FOTO score to 74 or greater for return to function.                EVAL:  FOTO 61    PLAN  []  Upgrade activities as tolerated     [x]  Continue plan of care  []  Update interventions per flow sheet       []  Discharge due to:_  []  Other:_      Josef Browning PTA 9/8/2020  7:11 AM    Future Appointments   Date Time Provider Bridget Wolfe   9/14/2020  7:00 AM [facility]   9/14/2020  1:00 PM MD Jina Covarrubias

## 2020-09-14 ENCOUNTER — HOSPITAL ENCOUNTER (OUTPATIENT)
Dept: PHYSICAL THERAPY | Age: 65
Discharge: HOME | End: 2020-09-14
Payer: MEDICARE

## 2020-09-14 ENCOUNTER — OFFICE VISIT (OUTPATIENT)
Dept: ORTHOPEDIC SURGERY | Age: 65
End: 2020-09-14

## 2020-09-14 VITALS
OXYGEN SATURATION: 98 % | DIASTOLIC BLOOD PRESSURE: 80 MMHG | HEART RATE: 82 BPM | WEIGHT: 167.8 LBS | BODY MASS INDEX: 26.34 KG/M2 | SYSTOLIC BLOOD PRESSURE: 120 MMHG | RESPIRATION RATE: 18 BRPM | HEIGHT: 67 IN | TEMPERATURE: 97.1 F

## 2020-09-14 DIAGNOSIS — M75.01 ADHESIVE CAPSULITIS OF RIGHT SHOULDER: Primary | ICD-10-CM

## 2020-09-14 PROCEDURE — 97112 NEUROMUSCULAR REEDUCATION: CPT

## 2020-09-14 PROCEDURE — 97110 THERAPEUTIC EXERCISES: CPT

## 2020-09-14 NOTE — PROGRESS NOTES
Wei Steinberg  1955   Chief Complaint   Patient presents with    Shoulder Pain     f/u right shoulder        HISTORY OF PRESENT ILLNESS  Wei Steinberg is a 72 y.o. male who presents today for reevaluation of right shoulder. Patient rates pain as 0/10 today. Has been going to PT with great relief. Has improved ROM and pain. Still notes trouble with reaching behind but reports overall improvement. Has tried taking an NSAID. Has hx of a collarbone fracture, did not have surgery for this. He tried PT for the collarbone fracture. Patient denies any fever, chills, chest pain, shortness of breath or calf pain. The remainder of the review of systems is negative. There are no new illness or injuries to report since last seen in the office. There are no changes to medications, allergies, family or social history. PHYSICAL EXAM:   Visit Vitals  /80 (BP 1 Location: Left arm, BP Patient Position: Sitting)   Pulse 82   Temp 97.1 °F (36.2 °C)   Resp 18   Ht 5' 7\" (1.702 m)   Wt 167 lb 12.8 oz (76.1 kg)   SpO2 98%   BMI 26.28 kg/m²     The patient is a well-developed, well-nourished male   in no acute distress. The patient is alert and oriented times three. The patient is alert and oriented times three. Mood and affect are normal.  LYMPHATIC: lymph nodes are not enlarged and are within normal limits  SKIN: normal in color and non tender to palpation. There are no bruises or abrasions noted. NEUROLOGICAL: Motor sensory exam is within normal limits. Reflexes are equal bilaterally.  There is normal sensation to pinprick and light touch  MUSCULOSKELETAL:  Examination Right shoulder   Skin Intact   AC joint tenderness -   Biceps tenderness -   Forward flexion/Elevation    Active abduction    Glenohumeral abduction  90   External rotation ROM  60   Internal rotation ROM 30   Apprehension -   Yvettes Relocation -   Jerk -   Load and Shift -   Obriens -   Speeds -   Impingement sign  negative sign   Supraspinatus/Empty Can -, 5/5   External Rotation Strength -, 5/5   Lift Off/Belly Press -, 5/5   Neurovascular Intact        IMAGING: XR of right shoulder obtained in the office dated 8/12/2020 was reviewed and read by Dr. Marcello Driscoll: Sclerotic changes in the greater tuberosity. IMPRESSION:      ICD-10-CM ICD-9-CM    1. Adhesive capsulitis of right shoulder  M75.01 726.0         PLAN:   1. Pt presents today with right shoulder pain due to adhesive capsulitis that has improved significantly with PT. Can continue with PT and follow up as needed. Risk factors include: n/a  2. No ultrasound exam indicated today  3. No cortisone injection indicated today   4. No Physical/Occupational Therapy indicated today  5. No diagnostic test indicated today:   6. No durable medical equipment indicated today  7. No referral indicated today   8. No medications indicated today:   9. No Narcotic indicated today       RTC prn      Scribed by Kaila Aguiartingtiti Mancuso) as dictated by Orlin Garcia MD    I, Dr. Orlin Garcia, confirm that all documentation is accurate.     Orlin Garcia M.D.   [facility]

## 2020-09-14 NOTE — PROGRESS NOTES
PT DAILY TREATMENT NOTE 10-18    Patient Name: Yael Puentes  Date:2020  : 1955  [x]  Patient  Verified  Payor: [place] MEDICARE / Plan: [place] MEDICARE PART A & B / Product Type: Medicare /    In time:701  Out time:755  Total Treatment Time (min): 54  Visit #: 7 of -    Medicare/BCBS Only   Total Timed Codes (min):  54 1:1 Treatment Time:  44       Treatment Area: Right shoulder pain [M25.511]    SUBJECTIVE  Pain Level (0-10 scale): 0  Any medication changes, allergies to medications, adverse drug reactions, diagnosis change, or new procedure performed?: [x] No    [] Yes (see summary sheet for update)  Subjective functional status/changes:   [] No changes reported  Reports decreased pain with forward flexion and flexion into extension and improved ease and comfort with swimming. Feels 70% improvement in symptoms but still feels \"some tightness. \" Wants to improve ease and comfort of front stroke with swimming and tossing a football. baseball. OBJECTIVE    Modality rationale: decrease inflammation and decrease pain to improve the patients ability to improve ease and comfort of ADLs. Min Type Additional Details   10 [x]  Ice     []  heat  []  Ice massage  []  Laser   []  Anodyne Position:seated  Location:right shoulder       34 min Therapeutic Exercise:  [x] See flow sheet : exercises initiated and progressed per flowsheet   Rationale: increase ROM and increase strength to improve the patients ability to perform pain free ADLs. 10 min Neuromuscular Re-education:  [x]  See flow sheet : PNF and serratus anterior strengthening to improve scapulothoracic rhythm and control   Rationale: increase strength, improve coordination and increase proprioception  to improve the patients ability to eccentrically control the right shoulder for swimming.            With   [] TE   [] TA   [] neuro   [] other: Patient Education: [x] Review HEP    [] Progressed/Changed HEP based on:   [] positioning   [] body mechanics   [] transfers   [] heat/ice application    [] other:      Other Objective/Functional Measures: see PN, Functional IR to sacrum     Pain Level (0-10 scale) post treatment: 0    ASSESSMENT/Changes in Function: Since Mr. Messina's SOC, he has noted improved ease of shoulder elevation and reduced pain with eccentric lowering. He has made improvements with functional horizontal adduction, now able to wash his opposite shoulder. He also reports improvement in pain when reaching into the back seat of his car. He continues to have some pain with eccentric lowering of the right UE in the scapular plane and with ABD, limiting his ability to perform forward \"crawl\" swimming and capacity to gently throw a football and baseball with family. Progressed patient to right UE PNF strengthening in D1 and D2. Pt has some discomfort \"tightening\" upon eccentric D2 overhead flexion to neutral flexion but is able to complete exercise. HEP to be updated and his next visit. Patient will continue to benefit from skilled PT services to modify and progress therapeutic interventions, address functional mobility deficits, address ROM deficits, address strength deficits, analyze and address soft tissue restrictions, analyze and cue movement patterns, analyze and modify body mechanics/ergonomics, assess and modify postural abnormalities and instruct in home and community integration to attain remaining goals.      []  See Plan of Care  [x]  See progress note/recertification  []  See Discharge Summary         Progress towards goals / Updated goals:  Short Term Goals: To be accomplished in 2 weeks:  1.  Patient will be educated and independent with HEP              EVAL: Issued Initial Exercise               Current: met, pt reports compliance (8/31/2020)  2.  Patient to be educated and independent in shoulder setting with ROM and strength exercise.               EVAL:  Educated pt in shoulder setting.     Current: met, minimal to no cueing required to correct scapular positioning (9/14/2020)  Long Term Goals: To be accomplished in 4 weeks:  1.  Patient to report ability to reach across body when showering for ADL's              EVAL:  Patient unable to reach across body to opposite axilla.              Current: met, Patient is able to reach just behind left axilla and to acromion for bathing (9/14/2020)  2.  Patient to report ability to reach back while driving with minimal pain.              EVAL:  Patient unable to reach behind without severe pain.              Current: Progressing, with decreased c/o pain.  9/8/2020  3.  Patient to report ability to overhead reach without eccentric pain.              EVAL:  Patient needing upper arm support with eccentric motion.              Current: met, patient was able to reach overhead and lower arm to side independently and without pain (8/28/20)   4. Patient to report increased FOTO score to 74 or greater for return to function.                EVAL:  FOTO 61   Current: progressing, 72 (9/14/2020)    PLAN  []  Upgrade activities as tolerated     [x]  Continue plan of care  []  Update interventions per flow sheet       []  Discharge due to:_  []  Other:_      Pelon Manzanares, PT 9/14/2020  7:09 AM    Future Appointments   Date Time Provider Bridget Wolfe   9/14/2020  1:00 PM MD Yolanda Desai 69

## 2020-09-14 NOTE — PROGRESS NOTES
[facility]  [address]  [place], [address]  [phone] fax    Continued Plan of Care/ Re-certification for Physical Therapy Services    Patient name: Shelley Reid Start of Care: 2020   Referral source: Rajendra Lundy,* : 1955   Medical/Treatment Diagnosis: Right shoulder pain [M25.511]  Payor: VA MEDICARE / Plan: VA MEDICARE PART A & B / Product Type: Medicare /  Onset Date:2020     Prior Hospitalization: see medical history Provider#: [id number]   Medications: Verified on Patient Summary List    Comorbidities: Skin Cancer    Prior Level of Function: Independent  Visits from Start of Care: 7    Missed Visits: 0    The Plan of Care and following information is based on the patient's current status:  Short Term Goals: To be accomplished in 2 weeks:  1.  Patient will be educated and independent with HEP              EVAL: Issued Initial Exercise               Current: met, pt reports compliance   2.  Patient to be educated and independent in shoulder setting with ROM and strength exercise.               EVAL:  Educated pt in shoulder setting.              Current: met, minimal to no cueing required to correct scapular positioning   Long Term Goals: To be accomplished in 4 weeks:  1.  Patient to report ability to reach across body when showering for ADL's              EVAL:  Patient unable to reach across body to opposite axilla.              Current: met, Patient is able to reach just behind left axilla and to acromion for bathing   2.  Patient to report ability to reach back while driving with minimal pain.              EVAL:  Patient unable to reach behind without severe pain.              Current: Progressing, with decreased c/o pain.    3.  Patient to report ability to overhead reach without eccentric pain.              EVAL:  Patient needing upper arm support with eccentric motion.              Current: met, patient was able to reach overhead and lower arm to side independently and without pain    4. Patient to report increased FOTO score to 74 or greater for return to function.                EVAL:  FOTO 61              Current: progressing, 72    Key functional changes: FOTO 72%, cross body adduction to left acromion      Problems/ barriers to goal attainment: some remaining pain with eccentric lowering of the right [facility] joint     Problem List: pain affecting function, decrease ROM, decrease strength, decrease ADL/ functional abilitiies, decrease activity tolerance and decrease flexibility/ joint mobility    Treatment Plan: Therapeutic exercise, Therapeutic activities, Neuromuscular re-education, Physical agent/modality, Manual therapy, Patient education, Self Care training and Functional mobility training     Patient Goal (s) has been updated and includes: no pain with forward crawl swimming, ability to toss a football with little or no pain     Goals for this certification period to be accomplished in 3 weeks:  1.  Patient to report ability to reach back while driving with minimal pain.              PN: Progressing, with decreased c/o pain. 2.  Patient to report ability to perform forward overhead crawl swimming without eccentric pain.              PN: Some pain   3. Patient to report increased FOTO score to 74 or greater for return to function.                PN: progressing, 72    4. Pt will indicate readiness to progress to d/c with HEP to improve independence with self care. PN: HEP to be updated at . [address]. Frequency / Duration: Patient to be seen 2 times per week for 3 weeks:    Assessment / Recommendations:Since Mr. Messina's SOC, he has noted improved ease of shoulder elevation and reduced pain with eccentric lowering. He has made improvements with functional horizontal adduction, now able to wash his opposite shoulder. He also reports improvement in pain when reaching into the back seat of his car.  He continues to have some pain with eccentric lowering of the right UE in the scapular plane and with ABD, limiting his ability to perform forward \"crawl\" swimming and capacity to gently throw a football and baseball with family. Progressed patient to right UE PNF strengthening in D1 and D2. Pt has some discomfort \"tightening\" upon eccentric D2 overhead flexion to neutral flexion but is able to complete exercise. HEP to be updated and his next visit.      Patient will continue to benefit from skilled PT services to modify and progress therapeutic interventions, address functional mobility deficits, address ROM deficits, address strength deficits, analyze and address soft tissue restrictions, analyze and cue movement patterns, analyze and modify body mechanics/ergonomics, assess and modify postural abnormalities and instruct in home and community integration to attain remaining goals. Certification Period: 9/15/2020 -- 10/14/2020    Rc Echavarria, PT 9/14/2020 7:48 AM    ________________________________________________________________________  I certify that the above Therapy Services are being furnished while the patient is under my care. I agree with the treatment plan and certify that this therapy is necessary. [] I have read the above and request that my patient continue as recommended.   [] I have read the above report and request that my patient continue therapy with the following changes/special instructions: _____________________________________________  [] I have read the above report and request that my patient be discharged from therapy    Physician's Signature:____________Date:_________TIME:________    ** Signature, Date and Time must be completed for valid certification **    Please sign and return to [facility] [address]  [place], [address]  [phone] fax

## 2020-09-17 ENCOUNTER — HOSPITAL ENCOUNTER (OUTPATIENT)
Dept: PHYSICAL THERAPY | Age: 65
Discharge: HOME | End: 2020-09-17
Payer: MEDICARE

## 2020-09-17 PROCEDURE — 97112 NEUROMUSCULAR REEDUCATION: CPT

## 2020-09-17 PROCEDURE — 97140 MANUAL THERAPY 1/> REGIONS: CPT

## 2020-09-17 PROCEDURE — 97110 THERAPEUTIC EXERCISES: CPT

## 2020-09-17 NOTE — PROGRESS NOTES
PT DAILY TREATMENT NOTE 10-18    Patient Name: Maci Search  Date:2020  : 1955  [x]  Patient  Verified  Payor: VA MEDICARE / Plan: VA MEDICARE PART A & B / Product Type: Medicare /    In time:229 (230)  Out time:325  Total Treatment Time (min): 54  Visit #: 1 of 6    Medicare/BCBS Only   Total Timed Codes (min):  55 1:1 Treatment Time:  45       Treatment Area: Right shoulder pain [M25.511]    SUBJECTIVE   Pain Level (0-10 scale): 0  Any medication changes, allergies to medications, adverse drug reactions, diagnosis change, or new procedure performed?: [x] No    [] Yes (see summary sheet for update)  Subjective functional status/changes:   [] No changes reported  Asks therapist if he would be able to bowl next week with 6 pound ball. No pain today. OBJECTIVE    Modality rationale: decrease inflammation and decrease pain to improve the patients ability to perform self care. Min Type Additional Details   10 [x]  Ice     []  heat  []  Ice massage  []  Laser   []  Anodyne Position: seated  Location: left shoulder   [] Skin assessment post-treatment:  []intact []redness- no adverse reaction    []redness  adverse reaction:     29 min Therapeutic Exercise:  [x] See flow sheet : exercises initiated per flowsheet   Rationale: increase ROM and increase strength to improve the patients ability to perform ADLs. 8 min Neuromuscular Re-education:  [x]  See flow sheet : PNF, Juju series, serratus anterior strengthening for scapulohumeral rhythm   Rationale: increase strength, improve coordination and increase proprioception  to improve the patients ability to perform overhead ADLs. 8 min Manual Therapy:  Left S/L, DTM to subscapularis with trigger point release   Rationale: decrease pain, increase ROM, increase tissue extensibility and decrease trigger points to improve ease of swimming.            With   [] TE   [] TA   [] neuro   [] other: Patient Education: [x] Review HEP    [] Progressed/Changed HEP based on:   [] positioning   [] body mechanics   [] transfers   [] heat/ice application    [] other:      Other Objective/Functional Measures: exercises progressed per flowsheet. New HEP initiated     Pain Level (0-10 scale) post treatment: 0    ASSESSMENT/Changes in Function: Exercises progressed with good effort given by pt. Pt expresses that exercises feel challenging with muscles feeling tight but there are no sharp pains like when he initially started therapy. Pt thoroughly educated to perform these home exercises every other day until his muscles become accustomed to the new load, progressing towards daily. Pt educated to not overwork the muscles so that he does not regress. Patient will continue to benefit from skilled PT services to modify and progress therapeutic interventions, address functional mobility deficits, address ROM deficits, address strength deficits, analyze and address soft tissue restrictions, analyze and cue movement patterns, analyze and modify body mechanics/ergonomics, assess and modify postural abnormalities and instruct in home and community integration to attain remaining goals. [x]  See Plan of Care  []  See progress note/recertification  []  See Discharge Summary         Progress towards goals / Updated goals:  1.  Patient to report ability to reach back while driving with minimal pain.              PN: Progressing, with decreased c/o pain. 2.  Patient to report ability to perform forward overhead crawl swimming without eccentric pain.              PN: Some pain   3. Patient to report increased FOTO score to 74 or greater for return to function.                PN: progressing, 72     4. Pt will indicate readiness to progress to d/c with HEP to improve independence with self care. PN: HEP to be updated at . [address].     PLAN  []  Upgrade activities as tolerated     [x]  Continue plan of care  []  Update interventions per flow sheet       [] Discharge due to:_  []  Other:_      Mil Larios, PT 9/17/2020  2:34 PM    Future Appointments   Date Time Provider Bridget Wolfe   9/21/2020  7:00 AM Braden Torres, PT [facility]   9/24/2020  7:00 AM Braden Torres, PT [facility]   9/28/2020  8:30 AM Braden Torres, PT [facility]   10/1/2020  7:00 AM Braden Torres, PT [facility]   10/5/2020  8:30 AM Braden Torres, PT [facility]

## 2020-09-21 ENCOUNTER — HOSPITAL ENCOUNTER (OUTPATIENT)
Dept: PHYSICAL THERAPY | Age: 65
Discharge: HOME | End: 2020-09-21
Payer: MEDICARE

## 2020-09-21 PROCEDURE — 97112 NEUROMUSCULAR REEDUCATION: CPT

## 2020-09-21 PROCEDURE — 97140 MANUAL THERAPY 1/> REGIONS: CPT

## 2020-09-21 PROCEDURE — 97110 THERAPEUTIC EXERCISES: CPT

## 2020-09-21 NOTE — PROGRESS NOTES
PT DAILY TREATMENT NOTE 10-18    Patient Name: Lizett Maradiaga  Date:2020  : 1955  [x]  Patient  Verified  Payor: VA MEDICARE / Plan: VA MEDICARE PART A & B / Product Type: Medicare /    In time:700  Out time:805  Total Treatment Time (min): 65  Visit #: 2 of 6    Medicare/BCBS Only   Total Timed Codes (min):  65 1:1 Treatment Time:  55       Treatment Area: Right shoulder pain [M25.511]    SUBJECTIVE  Pain Level (0-10 scale): 0  Any medication changes, allergies to medications, adverse drug reactions, diagnosis change, or new procedure performed?: [x] No    [] Yes (see summary sheet for update)  Subjective functional status/changes:   [] No changes reported  Reports some tightness in the anterior shoulder. \"it needs to be worked out. \"    OBJECTIVE    Modality rationale: decrease inflammation and decrease pain to improve the patients ability to perform self care post-therapy. Min Type Additional Details   10 [x]  Ice     []  heat  []  Ice massage  []  Laser   []  Anodyne Position: seated  Location: right shoulder   [] Skin assessment post-treatment:  []intact []redness- no adverse reaction    []redness  adverse reaction:     30 min Therapeutic Exercise:  [x] See flow sheet : exercises  Initiated and progressed per flowsheet   Rationale: increase ROM and increase strength to improve the patients ability to perform overhead ADLs. 10 min Neuromuscular Re-education:  [x]  See flow sheet : serratus anterior strengthening to improve ease of GH/ST rhythm, roxana series   Rationale: increase strength, improve coordination and increase proprioception  to improve the patients ability to perform dynamic recreational activities with the right shoulder    15 min Manual Therapy:  Left S/L DTM and trigger point release to the subscapularis and infraspinatus. Supine GH inferior and posterior GR4 oscillations in loose pack positioning followed by ROM with overpressure.    Rationale: decrease pain, increase ROM, increase tissue extensibility and decrease trigger points to improve ease of overhead ADLs and recreation. With   [] TE   [] TA   [] neuro   [] other: Patient Education: [x] Review HEP    [] Progressed/Changed HEP based on:   [] positioning   [] body mechanics   [] transfers   [] heat/ice application    [] other:      Other Objective/Functional Measures: exercises progressed per flowsheet  Right shoulder  deg with tight/empty end feel; 170 degrees flexion     Pain Level (0-10 scale) post treatment: 0    ASSESSMENT/Changes in Function: Brief cueing required at start of session with wall slides to relax out of slight right shoulder hike. Better form noted today with theraband  pulls today. Progressed supine horizontal abd/add eccentric exercise today to 3 pounds secondary to pt reports of improved ease with 2 pounds. Discussed bowling with 6 pounds, using 8 pound kettle bell with no pain noted in sagittal plane. Patient will continue to benefit from skilled PT services to modify and progress therapeutic interventions, address functional mobility deficits, address ROM deficits, address strength deficits, analyze and address soft tissue restrictions, analyze and cue movement patterns, analyze and modify body mechanics/ergonomics, assess and modify postural abnormalities and instruct in home and community integration to attain remaining goals. [x]  See Plan of Care  []  See progress note/recertification  []  See Discharge Summary         Progress towards goals / Updated goals:  1.  Patient to report ability to reach back while driving with minimal pain.              PN: Progressing, with decreased c/o pain.   2.  Patient to report ability to perform forward overhead crawl swimming without eccentric pain.              PN: Some pain   3. Patient to report increased FOTO score to 74 or greater for return to function.                PN: progressing, 72     4.  Pt will indicate readiness to progress to d/c with HEP to improve independence with self care.               PN: HEP to be updated at . [address].     PLAN  []  Upgrade activities as tolerated     [x]  Continue plan of care  []  Update interventions per flow sheet       []  Discharge due to:_  []  Other:_      Aisha Pace, PT 9/21/2020  7:05 AM    Future Appointments   Date Time Provider Bridget Nessi   9/24/2020  7:00 AM Melva Hernandez, PT [facility]   9/28/2020  8:30 AM Melva Hernandez, PT [facility]   10/1/2020  7:00 AM Melva Hernandez, PT [facility]   10/5/2020  8:30 AM Melva Hernandez, PT [facility]

## 2020-09-24 ENCOUNTER — HOSPITAL ENCOUNTER (OUTPATIENT)
Dept: PHYSICAL THERAPY | Age: 65
Discharge: HOME | End: 2020-09-24
Payer: MEDICARE

## 2020-09-24 PROCEDURE — 97110 THERAPEUTIC EXERCISES: CPT

## 2020-09-24 PROCEDURE — 97112 NEUROMUSCULAR REEDUCATION: CPT

## 2020-09-24 PROCEDURE — 97140 MANUAL THERAPY 1/> REGIONS: CPT

## 2020-09-24 NOTE — PROGRESS NOTES
PT DAILY TREATMENT NOTE 10-18    Patient Name: Jared Araiza  Date:2020  : 1955  [x]  Patient  Verified  Payor: [place] MEDICARE / Plan: [place] MEDICARE PART A & B / Product Type: Medicare /    In time:700  Out time:755  Total Treatment Time (min): 54  Visit #: 3 of 6    Medicare/BCBS Only   Total Timed Codes (min):  55 1:1 Treatment Time:  45       Treatment Area: Right shoulder pain [M25.511]    SUBJECTIVE  Pain Level (0-10 scale): 0  Any medication changes, allergies to medications, adverse drug reactions, diagnosis change, or new procedure performed?: [x] No    [] Yes (see summary sheet for update)  Subjective functional status/changes:   [] No changes reported  Reports bowling went well without pain. OBJECTIVE    Modality rationale: decrease inflammation and decrease pain to improve the patients ability to perform self care post-therapy. Min Type Additional Details   10 [x]  Ice     []  heat  []  Ice massage  []  Laser   []  Anodyne Position:seated  Location:left shoulder   [] Skin assessment post-treatment:  []intact []redness- no adverse reaction    []redness  adverse reaction:     25 min Therapeutic Exercise:  [x] See flow sheet : exercises per flowsheet   Rationale: increase ROM and increase strength to improve the patients ability to perform overhead ADLs. 10 min Neuromuscular Re-education:  [x]  See flow sheet : serratus anterior strengthening to improve ease of GH/ST rhythm, roxaan series   Rationale: increase strength, improve coordination and increase proprioception  to improve the patients ability to perform dynamic recreational activities with the right shoulder. 10 min Manual Therapy:  Left S/L -- DTM subscapularis and infraspinatus; supine inferior oscillations Gr3-4 at end range of motion with pec minor DTM. Rationale: decrease pain, increase ROM, increase tissue extensibility and decrease trigger points to improve ease of self care and recreation. With   [] TE   [] TA   [] neuro   [] other: Patient Education: [x] Review HEP    [] Progressed/Changed HEP based on:   [] positioning   [] body mechanics   [] transfers   [] heat/ice application    [] other:      Other Objective/Functional Measures: progressed resistance per flowsheet     Pain Level (0-10 scale) post treatment: 0    ASSESSMENT/Changes in Function: Pt requires intermittent cueing to avoid scapular anterior tilting and elevation with D2 PNF extensions and \"\" pulls. Continues to experience slight increase in discomfort pain/stretching with abduction wall slides when abducting and with eccentric lowering. ROM and function progressing well in the sagittal plane. Continue to work on postural strengthening and ABD ROM. Patient will continue to benefit from skilled PT services to modify and progress therapeutic interventions, address functional mobility deficits, address ROM deficits, address strength deficits, analyze and address soft tissue restrictions, analyze and cue movement patterns, analyze and modify body mechanics/ergonomics, assess and modify postural abnormalities and instruct in home and community integration to attain remaining goals. [x]  See Plan of Care  []  See progress note/recertification  []  See Discharge Summary         Progress towards goals / Updated goals:  1.  Patient to report ability to reach back while driving with minimal pain.              PN: Progressing, with decreased c/o pain.   2.  Patient to report ability to perform forward overhead crawl swimming without eccentric pain.              PN: Some pain    Current: progressing, some stiffness with slight discomfort; some eccentric pain with ABD wall slides (9/24/2020)  3. Patient to report increased FOTO score to 74 or greater for return to function.                PN: progressing, 72  4.  Pt will indicate readiness to progress to d/c with HEP to improve independence with self care.               PN: HEP to be updated at . [address]     PLAN  []  Upgrade activities as tolerated     [x]  Continue plan of care  []  Update interventions per flow sheet       []  Discharge due to:_  []  Other:_      Lucas Flynn, PT 9/24/2020  7:02 AM    Future Appointments   Date Time Provider Bridget Wolfe   9/28/2020  8:30 AM [facility]   10/1/2020  7:00 AM [facility]   10/5/2020  8:30 AM [facility]

## 2020-09-28 ENCOUNTER — HOSPITAL ENCOUNTER (OUTPATIENT)
Dept: PHYSICAL THERAPY | Age: 65
Discharge: HOME | End: 2020-09-28
Payer: MEDICARE

## 2020-09-28 PROCEDURE — 97140 MANUAL THERAPY 1/> REGIONS: CPT

## 2020-09-28 PROCEDURE — 97110 THERAPEUTIC EXERCISES: CPT

## 2020-10-01 ENCOUNTER — HOSPITAL ENCOUNTER (OUTPATIENT)
Dept: PHYSICAL THERAPY | Age: 65
Discharge: HOME | End: 2020-10-01
Payer: MEDICARE

## 2020-10-01 PROCEDURE — 97110 THERAPEUTIC EXERCISES: CPT

## 2020-10-01 PROCEDURE — 97140 MANUAL THERAPY 1/> REGIONS: CPT

## 2020-10-01 NOTE — PROGRESS NOTES
PT DAILY TREATMENT NOTE 10-18    Patient Name: Lona Whiteside  Date:10/1/2020  : 1955  [x]  Patient  Verified  Payor: VA MEDICARE / Plan: VA MEDICARE PART A & B / Product Type: Medicare /    In time:701  Out time:755  Total Treatment Time (min): 44  Visit #: 5 of 6    Medicare/BCBS Only   Total Timed Codes (min):  54 1:1 Treatment Time:  44       Treatment Area: Right shoulder pain [M25.511]    SUBJECTIVE  Pain Level (0-10 scale): 0  Any medication changes, allergies to medications, adverse drug reactions, diagnosis change, or new procedure performed?: [x] No    [] Yes (see summary sheet for update)  Subjective functional status/changes:   [] No changes reported  Reports some soreness following therapy, stating he tried to swim afterwards and felt like he had a bit of a greater reach. OBJECTIVE    Modality rationale: decrease inflammation and decrease pain to improve the patients ability to improve ease of self care. Min Type Additional Details   10 [x]  Ice     []  heat  []  Ice massage  []  Laser   []  Anodyne Position:seated  Location:right shoulder   [] Skin assessment post-treatment:  []intact []redness- no adverse reaction    []redness  adverse reaction:     36 min Therapeutic Exercise:  [x] See flow sheet :   Rationale: increase ROM, increase strength and improve coordination to improve the patients ability to perform functional activities     8 min Manual Therapy:  Pt supine -- GR3-4 GH oscillations at end range flexion (posterior mobs), and end range abd (inferior) follow by overpressure stretching. GR1 long axis distraction oscillations in [facility] loose pack positioning. Rationale: increase ROM and increase tissue extensibility to improve ease of ADLs.            With   [] TE   [] TA   [] neuro   [] other: Patient Education: [x] Review HEP    [] Progressed/Changed HEP based on:   [] positioning   [] body mechanics   [] transfers   [] heat/ice application    [] other:      Other Objective/Functional Measures: added postural scapular retraction with horizontal abd ER banded exercises     Pain Level (0-10 scale) post treatment: 0-1    ASSESSMENT/Changes in Function: Pt performs exercises as directed, noting continued discomfort and tightness with IR towel stretch, doorway pec/ER stretch, and with postural \"W\" exercises in prone and in standing. Requires tactile cueing at the superior scapular border to decrease scapular elevation with wall slides into flexion and ABD. Pt educated that the return of this pattern will likely cause a return of symptoms  And that it is important to be mindful of this moving forward into discharge. D/c anticipated at next visit. Patient will continue to benefit from skilled PT services to modify and progress therapeutic interventions, address functional mobility deficits, address ROM deficits, address strength deficits, analyze and address soft tissue restrictions, analyze and cue movement patterns, analyze and modify body mechanics/ergonomics and assess and modify postural abnormalities to attain remaining goals. [x]  See Plan of Care  []  See progress note/recertification  []  See Discharge Summary         Progress towards goals / Updated goals:  1.  Patient to report ability to reach back while driving with minimal pain.              PN: Progressing, with decreased c/o pain.   2.  Patient to report ability to perform forward overhead crawl swimming without eccentric pain.              PN: Some pain               Current: progressing, some stiffness with slight discomfort; some eccentric pain with ABD wall slides (9/28/2020)  3. Patient to report increased FOTO score to 74 or greater for return to function.                PN: progressing, 72  4. Pt will indicate readiness to progress to d/c with HEP to improve independence with self care.               PN: HEP to be updated at . [address]     PLAN  []  Upgrade activities as tolerated     [x]  Continue plan of care  []  Update interventions per flow sheet       []  Discharge due to:_  []  Other:_      Starr Roa, PT 10/1/2020  7:09 AM    Future Appointments   Date Time Provider Bridget Wolfe   10/5/2020  8:30 AM Elizabeth Mcclain, PT [id number]

## 2020-10-05 ENCOUNTER — HOSPITAL ENCOUNTER (OUTPATIENT)
Dept: PHYSICAL THERAPY | Age: 65
Discharge: HOME | End: 2020-10-05
Payer: MEDICARE

## 2020-10-05 PROCEDURE — 97110 THERAPEUTIC EXERCISES: CPT

## 2020-10-05 PROCEDURE — 97140 MANUAL THERAPY 1/> REGIONS: CPT

## 2020-10-05 NOTE — PROGRESS NOTES
PT DISCHARGE DAILY NOTE AND [id number]    Date:10/5/2020  Patient name: Ed Phoenix Start of Care: 2020   Referral source: Clifford Odell,* : 1955   Medical/Treatment Diagnosis: Right shoulder pain [M25.511] Onset Date:2020     Prior Hospitalization: see medical history Provider#: [id number]   Medications: Verified on Patient Summary List    Comorbidities: skin cancer  Prior Level of Function:independent    Visits from Start of Care: 13    Missed Visits: 0    Reporting Period : 9/15/2020 to 10/5/2020    [x]  Patient  Verified  Payor: VA MEDICARE / Plan: VA MEDICARE PART A & B / Product Type: Medicare /    In time:830  Out time:934  Total Treatment Time (min): 64  Visit #: 6 of 6    Medicare/BCBS Only   Total Timed Codes (min):  64 1:1 Treatment Time:  54       SUBJECTIVE  Pain Level (0-10 scale): 0  Any medication changes, allergies to medications, adverse drug reactions, diagnosis change, or new procedure performed?: [x] No    [] Yes (see summary sheet for update)  Subjective functional status/changes:   [] No changes reported  Reports some continued tightness but that he feels like his functional internal rotation is beginning to improve with the stretching but functional ER continues to be limited. Reports some scapular elevation with forward crawl swimming. OBJECTIVE    Modality rationale: decrease pain to improve the patients ability to perform self care. Min Type Additional Details   10 [x]  Ice     []  heat  []  Ice massage  []  Laser   []  Anodyne Position: seated  Location: right shoulder   [] Skin assessment post-treatment:  []intact []redness- no adverse reaction    []redness  adverse reaction:     44 min Therapeutic Exercise:  [x] See flow sheet : exercises initiated per flowsheet   Rationale: increase ROM and increase strength to improve the patients ability to perform ADLs.      10 min Manual Therapy:  Supine -- GH inferior GR 4 mobs, anterior [address] [facility] GR4 mobs; left S/L STM to subscap and pec minor release   Rationale: increase ROM, increase tissue extensibility and decrease trigger points to improve ease of overhead swimming and throwing. With   [x] TE   [] TA   [] neuro   [] other: Patient Education: [x] Review HEP    [] Progressed/Changed HEP based on:   [] positioning   [] body mechanics   [] transfers   [] heat/ice application    [] other:      Other Objective/Functional Measures: [facility] ER at 90 degrees ABD measuring 40 degrees. ABD to 140 degrees, GH flexion 170 degrees     Pain Level (0-10 scale) post treatment: 0    Summary of Care:  1.  Patient to report ability to reach back while driving with minimal pain.              PN: Progressing, with decreased c/o pain.    Current: met. (10/5/2020)  2.  Patient to report ability to perform forward overhead crawl swimming without eccentric pain.              PN: Some pain               Current: progressing, some stiffness with slight discomfort; Requires some scapular elevation to achieve motion. (10/5/2020)  3. Patient to report increased FOTO score to 74 or greater for return to function.                PN: progressing, 72   Current: met, 87 (10/5/2020)  4. Pt will indicate readiness to progress to d/c with HEP to improve independence with self care.              PN: HEP to be updated at . [address]. Current: met. (10/5/2020)    ASSESSMENT/Changes in Function: Pt has made improvements in ROM, strengthening, and general flexibility since his last re-certification. Pt advised to hold forward crawl swimming as he admits to needing to raise his shoulder to perform this activity correctly. Recommended lateral and back stroke patterns until functional ER improves. Pt educated that throwing a ball for his dog intermittently should be okay as long as it is pain-free and he remains conscious of his scapular posture, ensuring he is not elevating for adequate ROM.  Educated pt that moving into his HEP, it will take time to continue to improve GH ABD and functional IR/ER ROM. Pt vocalizes understanding.      Thank you for this referral!     PLAN  [x]Discontinue therapy: [x]Patient has reached or is progressing toward set goals      []Patient is non-compliant or has abdicated      []Due to lack of appreciable progress towards set goals    Ryan Sevilla, DOC 10/5/2020  8:22 AM

## 2021-03-03 ENCOUNTER — HOSPITAL ENCOUNTER (OUTPATIENT)
Dept: LAB | Age: 66
Discharge: HOME | End: 2021-03-03
Payer: MEDICARE

## 2021-03-03 PROCEDURE — 88305 TISSUE EXAM BY PATHOLOGIST: CPT

## 2023-02-10 ENCOUNTER — OFFICE VISIT (OUTPATIENT)
Dept: INTERNAL MEDICINE CLINIC | Age: 68
End: 2023-02-10
Payer: MEDICARE

## 2023-02-10 VITALS
SYSTOLIC BLOOD PRESSURE: 111 MMHG | WEIGHT: 174.4 LBS | OXYGEN SATURATION: 98 % | TEMPERATURE: 96.4 F | HEART RATE: 78 BPM | BODY MASS INDEX: 27.31 KG/M2 | DIASTOLIC BLOOD PRESSURE: 80 MMHG

## 2023-02-10 DIAGNOSIS — Z76.89 ESTABLISHING CARE WITH NEW DOCTOR, ENCOUNTER FOR: Primary | ICD-10-CM

## 2023-02-10 DIAGNOSIS — C43.4 MALIGNANT MELANOMA OF SCALP (HCC): ICD-10-CM

## 2023-02-10 DIAGNOSIS — C44.90 SKIN CANCER: ICD-10-CM

## 2023-02-10 NOTE — PROGRESS NOTES
BRADY     Wendy Mcclellan is here to establish new primary care. He is overall healthy, medical history is significant mostly for skin cancers, melanoma on his scalp, and a few squamous cell carcinomas on his shoulder and trunk. He takes no prescription medications, and has no issues today. Past Medical History:   Diagnosis Date    Collar bone fracture     Melanoma ([address]     scalp    Right foot pain     Lateral plantar foot pain, possible compression neuropathy    Skin cancer     sqamous cell    Wears glasses         Past Surgical History:   Procedure Laterality Date    HX APPENDECTOMY  1967    HX HERNIA REPAIR Right     umbilical and groin    HX TONSILLECTOMY Bilateral 1974        Current Outpatient Medications   Medication Sig Dispense Refill    omega-3 fatty acids cap Take  by mouth.    (Patient not taking: Reported on 2/10/2023)          No Known Allergies     Social History     Socioeconomic History    Marital status:      Spouse name: Not on file    Number of children: Not on file    Years of education: Not on file    Highest education level: Not on file   Occupational History    Not on file   Tobacco Use    Smoking status: Never    Smokeless tobacco: Never   Substance and Sexual Activity    Alcohol use: Yes     Comment: weekly    Drug use: No    Sexual activity: Not on file   Other Topics Concern    Not on file   Social History Narrative    Not on file     Social Determinants of Health     Financial Resource Strain: Not on file   Food Insecurity: Not on file   Transportation Needs: Not on file   Physical Activity: Not on file   Stress: Not on file   Social Connections: Not on file   Intimate Partner Violence: Not on file   Housing Stability: Not on file        Family History   Problem Relation Age of Onset    Asthma Mother     Asthma Father     OSTEOARTHRITIS Other     Hypertension Other            Visit Vitals  /80 (BP 1 Location: Left upper arm, BP Patient Position: Sitting)   Pulse 78 Temp (!) 96.4 °F (35.8 °C) (Temporal)   Wt 174 lb 6.4 oz (79.1 kg)   SpO2 98%   BMI 27.31 kg/m²        Review of Systems   Respiratory:  Negative for shortness of breath. Cardiovascular:  Negative for chest pain. Gastrointestinal:  Negative for blood in stool. Genitourinary:  Negative for hematuria. Psychiatric/Behavioral:  Negative for depression. The patient is not nervous/anxious. Physical Exam  Constitutional:       General: He is not in acute distress. Eyes:      General: No scleral icterus. Conjunctiva/sclera: Conjunctivae normal.   Neck:      Comments: Carotid upstrokes normal to palpation  Cardiovascular:      Rate and Rhythm: Normal rate and regular rhythm. Pulses: Normal pulses. Heart sounds: No murmur heard. No friction rub. No gallop. Pulmonary:      Effort: Pulmonary effort is normal.      Breath sounds: Normal breath sounds. Abdominal:      General: Abdomen is flat. Palpations: Abdomen is soft. Tenderness: There is no abdominal tenderness. Musculoskeletal:      Cervical back: Neck supple. Comments: No clubbing, cyanosis, or edema. Calves nontender, no cords. Skin:     General: Skin is warm and dry. Comments: Sun damage on dorsum of both hands   Neurological:      Mental Status: He is alert and oriented to person, place, and time. Psychiatric:         Mood and Affect: Mood normal.        I spent 31 minutes in review of EMR prior to patient arrival,update of medical, surgical, social, family history with patient, review of systems, physical exam, discussion of sun protection, and completion of chart. Diagnoses and all orders for this visit:    1. Establishing care with new doctor, encounter for  Comments:  Reviewed EMR prior to patient arrival.  With patient, updated past medical, surgical, family, and social history. He will try to get us his colonoscopy report    2.  Malignant melanoma of scalp ([facility]  Comments:  Status post Mohs excision    3. Skin cancer  Comments:  Squamous cell carcinomas of chest area. Reviewed sun safety, wears sunscreen and gloves. Follow-up and Dispositions    Return for [facility]-- labs in near future, I will mail lab order to him.               Casie Mcgrath MD

## 2023-02-10 NOTE — PROGRESS NOTES
1. \"Have you been to the ER, urgent care clinic since your last visit? Hospitalized since your last visit? \" No    2. \"Have you seen or consulted any other health care providers outside of the [address] [place] [address] since your last visit? \" No     3. For patients aged 39-70: Has the patient had a colonoscopy / FIT/ Cologuard? Yes - no Care Gap present per pt completed at [facility] 2017. If the patient is female:    4. For patients aged 41-77: Has the patient had a mammogram within the past 2 years? NA - based on age or sex      11. For patients aged 21-65: Has the patient had a pap smear?  NA - based on age or sex

## 2023-02-22 ENCOUNTER — TELEPHONE (OUTPATIENT)
Age: 68
End: 2023-02-22

## 2023-02-22 NOTE — TELEPHONE ENCOUNTER
I have put lab orders in for him, please schedule him to have blood work done a week before his visit in August

## 2023-02-23 NOTE — TELEPHONE ENCOUNTER
Patient reached, he is not home at the moment but will call back to schedule once he is so he can look at this calendar

## 2023-03-16 ENCOUNTER — TELEPHONE (OUTPATIENT)
Age: 68
End: 2023-03-16

## 2023-03-16 DIAGNOSIS — Z85.828 HISTORY OF SKIN CANCER: Primary | ICD-10-CM

## 2023-03-16 NOTE — TELEPHONE ENCOUNTER
Patient called and would like to know if he could get a referral to a dermatologist. Patient states he has had previous problems in dealing with skin cancer. His callback number is [phone]. Please advise.

## 2023-08-09 ENCOUNTER — HOSPITAL ENCOUNTER (OUTPATIENT)
Facility: HOSPITAL | Age: 68
Setting detail: SPECIMEN
Discharge: HOME | End: 2023-08-12
Payer: MEDICARE

## 2023-08-09 DIAGNOSIS — Z13.220 LIPID SCREENING: ICD-10-CM

## 2023-08-09 DIAGNOSIS — Z12.5 PROSTATE CANCER SCREENING: ICD-10-CM

## 2023-08-09 DIAGNOSIS — Z12.5 PROSTATE CANCER SCREENING: Primary | ICD-10-CM

## 2023-08-09 DIAGNOSIS — Z13.1 SCREENING FOR DIABETES MELLITUS: ICD-10-CM

## 2023-08-09 LAB
ANION GAP SERPL CALC-SCNC: 6 MMOL/L (ref 3–18)
BUN SERPL-MCNC: 22 MG/DL (ref 7–18)
BUN/CREAT SERPL: 26 (ref 12–20)
CALCIUM SERPL-MCNC: 9.1 MG/DL (ref 8.5–10.1)
CHLORIDE SERPL-SCNC: 103 MMOL/L (ref 100–111)
CHOLEST SERPL-MCNC: 190 MG/DL
CO2 SERPL-SCNC: 30 MMOL/L (ref 21–32)
CREAT SERPL-MCNC: 0.86 MG/DL (ref 0.6–1.3)
GLUCOSE SERPL-MCNC: 99 MG/DL (ref 74–99)
HDLC SERPL-MCNC: 59 MG/DL (ref 40–60)
HDLC SERPL: 3.2 (ref 0–5)
LDLC SERPL CALC-MCNC: 112.2 MG/DL (ref 0–100)
LIPID PANEL: ABNORMAL
POTASSIUM SERPL-SCNC: 5.1 MMOL/L (ref 3.5–5.5)
PSA SERPL-MCNC: 1.3 NG/ML (ref 0–4)
SODIUM SERPL-SCNC: 139 MMOL/L (ref 136–145)
TRIGL SERPL-MCNC: 94 MG/DL
VLDLC SERPL CALC-MCNC: 18.8 MG/DL

## 2023-08-09 PROCEDURE — 80048 BASIC METABOLIC PNL TOTAL CA: CPT

## 2023-08-09 PROCEDURE — 36415 COLL VENOUS BLD VENIPUNCTURE: CPT

## 2023-08-09 PROCEDURE — G0103 PSA SCREENING: HCPCS

## 2023-08-09 PROCEDURE — 80061 LIPID PANEL: CPT

## 2023-08-18 ENCOUNTER — OFFICE VISIT (OUTPATIENT)
Age: 68
End: 2023-08-18

## 2023-08-18 VITALS
TEMPERATURE: 98.2 F | SYSTOLIC BLOOD PRESSURE: 120 MMHG | HEIGHT: 68 IN | RESPIRATION RATE: 18 BRPM | DIASTOLIC BLOOD PRESSURE: 74 MMHG | WEIGHT: 171 LBS | BODY MASS INDEX: 25.91 KG/M2 | HEART RATE: 70 BPM | OXYGEN SATURATION: 98 %

## 2023-08-18 DIAGNOSIS — Z80.0 FAMILY HISTORY OF COLON CANCER IN FATHER: ICD-10-CM

## 2023-08-18 DIAGNOSIS — Z00.00 MEDICARE ANNUAL WELLNESS VISIT, SUBSEQUENT: Primary | ICD-10-CM

## 2023-08-18 DIAGNOSIS — E78.00 HYPERCHOLESTEREMIA: ICD-10-CM

## 2023-08-18 DIAGNOSIS — Z12.5 SCREENING FOR PROSTATE CANCER: ICD-10-CM

## 2023-08-18 DIAGNOSIS — E78.49 OTHER HYPERLIPIDEMIA: ICD-10-CM

## 2023-08-18 RX ORDER — OMEGA-3/DHA/EPA/FISH OIL 300-1000MG
CAPSULE ORAL
COMMUNITY

## 2023-08-18 SDOH — ECONOMIC STABILITY: HOUSING INSECURITY
IN THE LAST 12 MONTHS, WAS THERE A TIME WHEN YOU DID NOT HAVE A STEADY PLACE TO SLEEP OR SLEPT IN A SHELTER (INCLUDING NOW)?: NO

## 2023-08-18 SDOH — ECONOMIC STABILITY: FOOD INSECURITY: WITHIN THE PAST 12 MONTHS, THE FOOD YOU BOUGHT JUST DIDN'T LAST AND YOU DIDN'T HAVE MONEY TO GET MORE.: NEVER TRUE

## 2023-08-18 SDOH — ECONOMIC STABILITY: INCOME INSECURITY: HOW HARD IS IT FOR YOU TO PAY FOR THE VERY BASICS LIKE FOOD, HOUSING, MEDICAL CARE, AND HEATING?: NOT HARD AT ALL

## 2023-08-18 SDOH — ECONOMIC STABILITY: FOOD INSECURITY: WITHIN THE PAST 12 MONTHS, YOU WORRIED THAT YOUR FOOD WOULD RUN OUT BEFORE YOU GOT MONEY TO BUY MORE.: NEVER TRUE

## 2023-08-18 ASSESSMENT — ENCOUNTER SYMPTOMS
BLOOD IN STOOL: 0
SHORTNESS OF BREATH: 0

## 2023-08-18 ASSESSMENT — PATIENT HEALTH QUESTIONNAIRE - PHQ9
SUM OF ALL RESPONSES TO PHQ9 QUESTIONS 1 & 2: 0
SUM OF ALL RESPONSES TO PHQ QUESTIONS 1-9: 0
SUM OF ALL RESPONSES TO PHQ QUESTIONS 1-9: 0
1. LITTLE INTEREST OR PLEASURE IN DOING THINGS: 0
SUM OF ALL RESPONSES TO PHQ QUESTIONS 1-9: 0
SUM OF ALL RESPONSES TO PHQ QUESTIONS 1-9: 0
2. FEELING DOWN, DEPRESSED OR HOPELESS: 0

## 2023-08-18 ASSESSMENT — LIFESTYLE VARIABLES
HOW MANY STANDARD DRINKS CONTAINING ALCOHOL DO YOU HAVE ON A TYPICAL DAY: 1 OR 2
HOW OFTEN DO YOU HAVE A DRINK CONTAINING ALCOHOL: MONTHLY OR LESS

## 2023-08-18 NOTE — PROGRESS NOTES
Scottie Meyer presents today for   Chief Complaint   Patient presents with    Medicare AWV                 1. \"Have you been to the ER, urgent care clinic since your last visit? Hospitalized since your last visit? \" no    2. \"Have you seen or consulted any other health care providers outside of the [address] [place] [address] since your last visit? \" no     3. For patients aged 43-73: Has the patient had a colonoscopy / FIT/ Cologuard? Yes - no Care Gap present      If the patient is female:    4. For patients aged 43-66: Has the patient had a mammogram within the past 2 years? NA - based on age or sex      11. For patients aged 21-65: Has the patient had a pap smear?  NA - based on age or sex

## 2023-08-18 NOTE — ACP (ADVANCE CARE PLANNING)
Advance Care Planning     Advance Care Planning (ACP) Physician/NP/PA Conversation    Date of Conversation: 8/18/2023  Conducted with: Patient with Decision Making Capacity    Healthcare Decision Maker:        Click here to complete [address] including selection of the Healthcare Decision Maker Relationship (ie \"Primary\")      Care Preferences:    Hospitalization: \"If your health worsens and it becomes clear that your chance of recovery is unlikely, what would be your preference regarding hospitalization? \"  The patient would prefer comfort-focused treatment without hospitalization. Ventilation: \"If you were unable to breath on your own and your chance of recovery was unlikely, what would be your preference about the use of a ventilator (breathing machine) if it was available to you? \"  The patient would desire the use of a ventilator. Resuscitation: \"In the event your heart stopped as a result of an underlying serious health condition, would you want attempts made to restart your heart, or would you prefer a natural death? \"  Yes, attempt to resuscitate.     artificial nutrition no feeding tubes    Conversation Outcomes / Follow-Up Plan:  ACP complete - no further action today  Reviewed DNR/DNI and patient elects Full Code (Attempt Resuscitation)    Length of Voluntary ACP Conversation in minutes:  16 minutes    Aissatou Hernandez MD

## 2023-08-18 NOTE — PROGRESS NOTES
HPI     Sonia Dominguez is here for a physical and a Medicare wellness/ACP visit. Reports no new issues and no new family history that he knows of. Past Medical History:   Diagnosis Date    Collar bone fracture     Right foot pain     Lateral plantar foot pain, possible compression neuropathy    Wears glasses         Past Surgical History:   Procedure Laterality Date    APPENDECTOMY  1967    HERNIA REPAIR Right     umbilical and groin    TONSILLECTOMY Bilateral 1974        Current Outpatient Medications   Medication Sig Dispense Refill    vitamin D 25 MCG (1000 UT) CAPS       fish oil-omega-3 fatty acids 1000 MG capsule Take by mouth       No current facility-administered medications for this visit. No Known Allergies     Social History     Socioeconomic History    Marital status:      Spouse name: Not on file    Number of children: Not on file    Years of education: Not on file    Highest education level: Not on file   Occupational History    Not on file   Tobacco Use    Smoking status: Never    Smokeless tobacco: Never   Substance and Sexual Activity    Alcohol use: Yes    Drug use: No    Sexual activity: Yes     Partners: Female   Other Topics Concern    Not on file   Social History Narrative    Not on file     Social Determinants of Health     Financial Resource Strain: Low Risk     Difficulty of Paying Living Expenses: Not hard at all   Food Insecurity: No Food Insecurity    Worried About [place] in the Last Year: Never true    [address] in the Last Year: Never true   Transportation Needs: Unknown    Lack of Transportation (Medical): Not on file    Lack of Transportation (Non-Medical):  No   Physical Activity: Insufficiently Active    Days of Exercise per Week: 7 days    Minutes of Exercise per Session: 20 min   Stress: Not on file   Social Connections: Not on file   Intimate Partner Violence: Not on file   Housing Stability: Unknown    Unable to Pay for Housing in the Last

## 2024-08-12 ENCOUNTER — HOSPITAL ENCOUNTER (OUTPATIENT)
Facility: HOSPITAL | Age: 69
Setting detail: SPECIMEN
Discharge: HOME | End: 2024-08-15
Payer: MEDICARE

## 2024-08-12 DIAGNOSIS — Z00.00 MEDICARE ANNUAL WELLNESS VISIT, SUBSEQUENT: ICD-10-CM

## 2024-08-12 DIAGNOSIS — Z12.5 SCREENING FOR PROSTATE CANCER: ICD-10-CM

## 2024-08-12 DIAGNOSIS — E78.00 HYPERCHOLESTEREMIA: ICD-10-CM

## 2024-08-12 LAB
ALBUMIN SERPL-MCNC: 4.1 G/DL (ref 3.4–5)
ALBUMIN/GLOB SERPL: 1.3 (ref 0.8–1.7)
ALP SERPL-CCNC: 76 U/L (ref 45–117)
ALT SERPL-CCNC: 49 U/L (ref 16–61)
ANION GAP SERPL CALC-SCNC: 6 MMOL/L (ref 3–18)
AST SERPL-CCNC: 30 U/L (ref 10–38)
BILIRUB SERPL-MCNC: 0.8 MG/DL (ref 0.2–1)
BUN SERPL-MCNC: 21 MG/DL (ref 7–18)
BUN/CREAT SERPL: 26 (ref 12–20)
CALCIUM SERPL-MCNC: 8.7 MG/DL (ref 8.5–10.1)
CHLORIDE SERPL-SCNC: 103 MMOL/L (ref 100–111)
CHOLEST SERPL-MCNC: 199 MG/DL
CO2 SERPL-SCNC: 30 MMOL/L (ref 21–32)
CREAT SERPL-MCNC: 0.82 MG/DL (ref 0.6–1.3)
ERYTHROCYTE [DISTWIDTH] IN BLOOD BY AUTOMATED COUNT: 12.5 % (ref 11.6–14.5)
GLOBULIN SER CALC-MCNC: 3.2 G/DL (ref 2–4)
GLUCOSE SERPL-MCNC: 97 MG/DL (ref 74–99)
HCT VFR BLD AUTO: 48.8 % (ref 36–48)
HDLC SERPL-MCNC: 58 MG/DL (ref 40–60)
HDLC SERPL: 3.4 (ref 0–5)
HGB BLD-MCNC: 15.6 G/DL (ref 13–16)
LDLC SERPL CALC-MCNC: 122 MG/DL (ref 0–100)
LIPID PANEL: ABNORMAL
MCH RBC QN AUTO: 29.5 PG (ref 24–34)
MCHC RBC AUTO-ENTMCNC: 32 G/DL (ref 31–37)
MCV RBC AUTO: 92.2 FL (ref 78–100)
NRBC # BLD: 0 K/UL (ref 0–0.01)
NRBC BLD-RTO: 0 PER 100 WBC
PLATELET # BLD AUTO: 234 K/UL (ref 135–420)
PMV BLD AUTO: 11.3 FL (ref 9.2–11.8)
POTASSIUM SERPL-SCNC: 4.2 MMOL/L (ref 3.5–5.5)
PROT SERPL-MCNC: 7.3 G/DL (ref 6.4–8.2)
PSA SERPL-MCNC: 1.6 NG/ML (ref 0–4)
RBC # BLD AUTO: 5.29 M/UL (ref 4.35–5.65)
SODIUM SERPL-SCNC: 139 MMOL/L (ref 136–145)
TRIGL SERPL-MCNC: 95 MG/DL
VLDLC SERPL CALC-MCNC: 19 MG/DL
WBC # BLD AUTO: 6.5 K/UL (ref 4.6–13.2)

## 2024-08-12 PROCEDURE — G0103 PSA SCREENING: HCPCS

## 2024-08-12 PROCEDURE — 36415 COLL VENOUS BLD VENIPUNCTURE: CPT

## 2024-08-12 PROCEDURE — 85027 COMPLETE CBC AUTOMATED: CPT

## 2024-08-12 PROCEDURE — 80053 COMPREHEN METABOLIC PANEL: CPT

## 2024-08-12 PROCEDURE — 80061 LIPID PANEL: CPT

## 2024-08-19 ENCOUNTER — HOSPITAL ENCOUNTER (OUTPATIENT)
Facility: HOSPITAL | Age: 69
Setting detail: SPECIMEN
Discharge: HOME | End: 2024-08-22
Payer: MEDICARE

## 2024-08-19 ENCOUNTER — OFFICE VISIT (OUTPATIENT)
Facility: CLINIC | Age: 69
End: 2024-08-19

## 2024-08-19 VITALS
HEIGHT: 68 IN | WEIGHT: 166 LBS | DIASTOLIC BLOOD PRESSURE: 79 MMHG | HEART RATE: 83 BPM | RESPIRATION RATE: 18 BRPM | BODY MASS INDEX: 25.16 KG/M2 | SYSTOLIC BLOOD PRESSURE: 112 MMHG | OXYGEN SATURATION: 99 % | TEMPERATURE: 97.8 F

## 2024-08-19 DIAGNOSIS — C43.4 MALIGNANT MELANOMA OF SCALP AND NECK (HCC): ICD-10-CM

## 2024-08-19 DIAGNOSIS — Z12.12 SCREENING FOR COLORECTAL CANCER: ICD-10-CM

## 2024-08-19 DIAGNOSIS — Z00.00 MEDICARE ANNUAL WELLNESS VISIT, SUBSEQUENT: Primary | ICD-10-CM

## 2024-08-19 DIAGNOSIS — Z12.11 SCREENING FOR COLORECTAL CANCER: ICD-10-CM

## 2024-08-19 DIAGNOSIS — Z23 ENCOUNTER FOR VACCINATION: ICD-10-CM

## 2024-08-19 DIAGNOSIS — R79.9 ELEVATED BUN: ICD-10-CM

## 2024-08-19 DIAGNOSIS — E78.00 HYPERCHOLESTEREMIA: ICD-10-CM

## 2024-08-19 DIAGNOSIS — Z12.5 ENCOUNTER FOR SCREENING FOR MALIGNANT NEOPLASM OF PROSTATE: ICD-10-CM

## 2024-08-19 DIAGNOSIS — Z80.0 FAMILY HISTORY OF COLON CANCER IN FATHER: ICD-10-CM

## 2024-08-19 LAB
APPEARANCE UR: CLEAR
BILIRUB UR QL: NEGATIVE
COLOR UR: YELLOW
GLUCOSE UR STRIP.AUTO-MCNC: NEGATIVE MG/DL
HGB UR QL STRIP: NEGATIVE
KETONES UR QL STRIP.AUTO: NEGATIVE MG/DL
LEUKOCYTE ESTERASE UR QL STRIP.AUTO: NEGATIVE
NITRITE UR QL STRIP.AUTO: NEGATIVE
PH UR STRIP: 5.5 (ref 5–8)
PROT UR STRIP-MCNC: NEGATIVE MG/DL
SP GR UR REFRACTOMETRY: 1.03 (ref 1–1.03)
UROBILINOGEN UR QL STRIP.AUTO: 0.2 EU/DL (ref 0.2–1)

## 2024-08-19 PROCEDURE — 81003 URINALYSIS AUTO W/O SCOPE: CPT

## 2024-08-19 SDOH — ECONOMIC STABILITY: INCOME INSECURITY: HOW HARD IS IT FOR YOU TO PAY FOR THE VERY BASICS LIKE FOOD, HOUSING, MEDICAL CARE, AND HEATING?: NOT HARD AT ALL

## 2024-08-19 SDOH — ECONOMIC STABILITY: FOOD INSECURITY: WITHIN THE PAST 12 MONTHS, THE FOOD YOU BOUGHT JUST DIDN'T LAST AND YOU DIDN'T HAVE MONEY TO GET MORE.: NEVER TRUE

## 2024-08-19 SDOH — ECONOMIC STABILITY: FOOD INSECURITY: WITHIN THE PAST 12 MONTHS, YOU WORRIED THAT YOUR FOOD WOULD RUN OUT BEFORE YOU GOT MONEY TO BUY MORE.: NEVER TRUE

## 2024-08-19 ASSESSMENT — PATIENT HEALTH QUESTIONNAIRE - PHQ9
SUM OF ALL RESPONSES TO PHQ QUESTIONS 1-9: 0
1. LITTLE INTEREST OR PLEASURE IN DOING THINGS: NOT AT ALL
SUM OF ALL RESPONSES TO PHQ QUESTIONS 1-9: 0

## 2024-08-19 ASSESSMENT — ENCOUNTER SYMPTOMS
SHORTNESS OF BREATH: 0
BLOOD IN STOOL: 0

## 2024-08-19 NOTE — PATIENT INSTRUCTIONS
attack. These may include:    Chest pain or pressure, or a strange feeling in the chest.     Sweating.     Shortness of breath.     Pain, pressure, or a strange feeling in the back, neck, jaw, or upper belly or in one or both shoulders or arms.     Lightheadedness or sudden weakness.     A fast or irregular heartbeat.   After you call 911, the  may tell you to chew 1 adult-strength or 2 to 4 low-dose aspirin. Wait for an ambulance. Do not try to drive yourself.  Watch closely for changes in your health, and be sure to contact your doctor if you have any problems.  Where can you learn more?  Go to https://www.[facility].net/patientEd and enter F075 to learn more about \"A Healthy Heart: Care Instructions.\"  Current as of: June 24, 2023  Content Version: 14.1  © 0226-7418 [employer].   Care instructions adapted under license by [employer]. If you have questions about a medical condition or this instruction, always ask your healthcare professional. [employer] disclaims any warranty or liability for your use of this information.      Personalized Preventive Plan for Stevie Painter - 8/19/2024  Medicare offers a range of preventive health benefits. Some of the tests and screenings are paid in full while other may be subject to a deductible, co-insurance, and/or copay.    Some of these benefits include a comprehensive review of your medical history including lifestyle, illnesses that may run in your family, and various assessments and screenings as appropriate.    After reviewing your medical record and screening and assessments performed today your provider may have ordered immunizations, labs, imaging, and/or referrals for you.  A list of these orders (if applicable) as well as your Preventive Care list are included within your After Visit Summary for your review.    Other Preventive Recommendations:    A preventive eye exam performed by an eye specialist is recommended

## 2024-08-19 NOTE — PROGRESS NOTES
Stevie Painter presents today for physical.              1. \"Have you been to the ER, urgent care clinic since your last visit?  Hospitalized since your last visit?\" no    2. \"Have you seen or consulted any other health care providers outside of the [facility] since your last visit?\" no     3. For patients aged 45-75: Has the patient had a colonoscopy / FIT/ Cologuard? Yes - no Care Gap present      If the patient is female:    4. For patients aged 40-74: Has the patient had a mammogram within the past 2 years? NA - based on age or sex      5. For patients aged 21-65: Has the patient had a pap smear? NA - based on age or sex  Stevie Painter 1955 male who presents for routine immunizations.   Patient denies any symptoms , reactions or allergies that would exclude them from being immunized today.  Risks and adverse reactions were discussed and the VIS was given to them. All questions were addressed.  Order placed for PPSV 23,  per Verbal Order from Karely Patel with read back.  There were no reactions observed.    Jose Saunders   
Tenderness: There is no abdominal tenderness.   Genitourinary:     Prostate: Normal.      Rectum: Normal. Guaiac result negative.   Musculoskeletal:      Cervical back: Neck supple.      Comments: No clubbing, cyanosis, or edema.  Calves NT, no cords   Skin:     General: Skin is warm and dry.   Neurological:      Mental Status: He is alert and oriented to person, place, and time.   Psychiatric:         Mood and Affect: Mood normal.                  Stevie was seen today for medicare awv.    Diagnoses and all orders for this visit:    Medicare annual wellness visit, subsequent  Comments:  non-smoker, normal BP, normal GINO, stable PSA.  .  Recommend RSV vaccine at pharmacy. Pneumovax 23 given after discussion and consent    Hypercholesteremia  Comments:  Calculated [place] MI risk before age 79 9.4%.  Discussed dietary changes, check with   next labs  Orders:  -     Comprehensive Metabolic Panel; Future  -     Lipid Panel; Future    Elevated BUN  Comments:  Likely due to fasting state, discussed, check with next labs  Orders:  -     Comprehensive Metabolic Panel; Future    Screening for colorectal cancer  Comments:  Normal GINO, I fob negative stool    Encounter for screening for malignant neoplasm of prostate  Comments:  Normal GINO/PSA;  PSA shortly before next visit  Orders:  -     PSA Screening; Future    Encounter for vaccination  -     Pneumococcal, PPSV23, PNEUMOVAX 23, (age 2 yrs+), SC/IM    Family history of colon cancer in father  Comments:  needs colonoscopy every 5 years, yearly GINO    Malignant melanoma of scalp and neck (HCC)  Comments:  treated by DR. Taylor in past, given name of liza magaña MD         No follow-up provider specified.         Karely Patel MD       Medicare Annual Wellness Visit    Stevie Painter is here for Medicare AWV    Assessment & Plan   Medicare annual wellness visit, subsequent  Recommendations for Preventive Services Due: see orders and patient

## 2024-12-18 ENCOUNTER — TELEPHONE (OUTPATIENT)
Facility: CLINIC | Age: 69
End: 2024-12-18

## 2025-08-06 ENCOUNTER — OFFICE VISIT (OUTPATIENT)
Facility: CLINIC | Age: 70
End: 2025-08-06
Payer: MEDICARE

## 2025-08-06 VITALS
WEIGHT: 165 LBS | TEMPERATURE: 98.2 F | SYSTOLIC BLOOD PRESSURE: 122 MMHG | OXYGEN SATURATION: 98 % | RESPIRATION RATE: 16 BRPM | DIASTOLIC BLOOD PRESSURE: 70 MMHG | HEIGHT: 68 IN | BODY MASS INDEX: 25.01 KG/M2 | HEART RATE: 80 BPM

## 2025-08-06 DIAGNOSIS — E78.2 MIXED HYPERLIPIDEMIA: ICD-10-CM

## 2025-08-06 DIAGNOSIS — Z12.11 COLON CANCER SCREENING: ICD-10-CM

## 2025-08-06 DIAGNOSIS — Z76.89 ENCOUNTER TO ESTABLISH CARE: Primary | ICD-10-CM

## 2025-08-06 DIAGNOSIS — Z12.5 SCREENING PSA (PROSTATE SPECIFIC ANTIGEN): ICD-10-CM

## 2025-08-06 DIAGNOSIS — Z85.828 HISTORY OF SKIN CANCER: ICD-10-CM

## 2025-08-06 PROBLEM — L25.9 CONTACT DERMATITIS: Status: ACTIVE | Noted: 2025-08-06

## 2025-08-06 PROBLEM — H52.209 ASTIGMATISM: Status: ACTIVE | Noted: 2025-08-06

## 2025-08-06 PROBLEM — L57.0 ACTINIC KERATOSIS: Status: ACTIVE | Noted: 2025-08-06

## 2025-08-06 PROBLEM — S42.002A CLOSED FRACTURE OF LEFT CLAVICLE: Status: RESOLVED | Noted: 2025-08-06 | Resolved: 2025-08-06

## 2025-08-06 PROBLEM — E78.5 HYPERLIPIDEMIA: Status: ACTIVE | Noted: 2025-08-06

## 2025-08-06 PROBLEM — C43.9: Status: RESOLVED | Noted: 2017-09-15 | Resolved: 2025-08-06

## 2025-08-06 PROBLEM — H52.00 HYPEROPIA: Status: ACTIVE | Noted: 2025-08-06

## 2025-08-06 PROBLEM — K40.90 INGUINAL HERNIA: Status: RESOLVED | Noted: 2025-08-06 | Resolved: 2025-08-06

## 2025-08-06 PROBLEM — H52.4 PRESBYOPIA: Status: ACTIVE | Noted: 2025-08-06

## 2025-08-06 PROCEDURE — 99387 INIT PM E/M NEW PAT 65+ YRS: CPT | Performed by: FAMILY MEDICINE

## 2025-08-06 PROCEDURE — 1160F RVW MEDS BY RX/DR IN RCRD: CPT | Performed by: FAMILY MEDICINE

## 2025-08-06 PROCEDURE — 1159F MED LIST DOCD IN RCRD: CPT | Performed by: FAMILY MEDICINE

## 2025-08-06 PROCEDURE — 1126F AMNT PAIN NOTED NONE PRSNT: CPT | Performed by: FAMILY MEDICINE

## 2025-08-06 SDOH — ECONOMIC STABILITY: FOOD INSECURITY: WITHIN THE PAST 12 MONTHS, YOU WORRIED THAT YOUR FOOD WOULD RUN OUT BEFORE YOU GOT MONEY TO BUY MORE.: NEVER TRUE

## 2025-08-06 SDOH — ECONOMIC STABILITY: FOOD INSECURITY: WITHIN THE PAST 12 MONTHS, THE FOOD YOU BOUGHT JUST DIDN'T LAST AND YOU DIDN'T HAVE MONEY TO GET MORE.: NEVER TRUE

## 2025-08-06 ASSESSMENT — PATIENT HEALTH QUESTIONNAIRE - PHQ9
1. LITTLE INTEREST OR PLEASURE IN DOING THINGS: NOT AT ALL
SUM OF ALL RESPONSES TO PHQ QUESTIONS 1-9: 0
2. FEELING DOWN, DEPRESSED OR HOPELESS: NOT AT ALL

## 2025-08-06 ASSESSMENT — ENCOUNTER SYMPTOMS
EYE REDNESS: 0
WHEEZING: 0
ABDOMINAL PAIN: 0
DIARRHEA: 0
CHEST TIGHTNESS: 0
SHORTNESS OF BREATH: 0
COUGH: 0